# Patient Record
Sex: FEMALE | Race: WHITE | Employment: OTHER | URBAN - METROPOLITAN AREA
[De-identification: names, ages, dates, MRNs, and addresses within clinical notes are randomized per-mention and may not be internally consistent; named-entity substitution may affect disease eponyms.]

---

## 2017-02-17 ENCOUNTER — TELEPHONE (OUTPATIENT)
Dept: FAMILY MEDICINE CLINIC | Age: 57
End: 2017-02-17

## 2017-02-17 ENCOUNTER — OFFICE VISIT (OUTPATIENT)
Dept: FAMILY MEDICINE CLINIC | Age: 57
End: 2017-02-17

## 2017-02-17 ENCOUNTER — HOSPITAL ENCOUNTER (OUTPATIENT)
Dept: NON INVASIVE DIAGNOSTICS | Age: 57
Discharge: HOME | End: 2017-02-17
Attending: NURSE PRACTITIONER | Admitting: NURSE PRACTITIONER

## 2017-02-17 VITALS
WEIGHT: 208 LBS | DIASTOLIC BLOOD PRESSURE: 60 MMHG | SYSTOLIC BLOOD PRESSURE: 100 MMHG | HEIGHT: 70 IN | BODY MASS INDEX: 29.78 KG/M2 | HEART RATE: 68 BPM | TEMPERATURE: 98.2 F

## 2017-02-17 DIAGNOSIS — J06.9 UPPER RESPIRATORY TRACT INFECTION, UNSPECIFIED TYPE: Primary | ICD-10-CM

## 2017-02-17 DIAGNOSIS — R07.9 CHEST PAIN, UNSPECIFIED TYPE: ICD-10-CM

## 2017-02-17 LAB
BASOPHILS ABSOLUTE: 0.1 K/UL (ref 0–0.2)
BASOPHILS RELATIVE PERCENT: 0.9 %
D DIMER: 0.25 UG/ML FEU
EOSINOPHILS ABSOLUTE: 0.2 K/UL (ref 0–0.6)
EOSINOPHILS RELATIVE PERCENT: 3.2 %
HCT VFR BLD CALC: 40.5 % (ref 36–48)
HEMOGLOBIN: 13.8 G/DL (ref 12–16)
LYMPHOCYTES ABSOLUTE: 2 K/UL (ref 1–5.1)
LYMPHOCYTES RELATIVE PERCENT: 31.1 %
MCH RBC QN AUTO: 31.5 PG (ref 26–34)
MCHC RBC AUTO-ENTMCNC: 34.1 G/DL (ref 31–36)
MCV RBC AUTO: 92.3 FL (ref 80–100)
MONOCYTES ABSOLUTE: 0.8 K/UL (ref 0–1.3)
MONOCYTES RELATIVE PERCENT: 11.8 %
NEUTROPHILS ABSOLUTE: 3.5 K/UL (ref 1.7–7.7)
NEUTROPHILS RELATIVE PERCENT: 53 %
PDW BLD-RTO: 12.6 % (ref 12.4–15.4)
PLATELET # BLD: 259 K/UL (ref 135–450)
PMV BLD AUTO: 8.2 FL (ref 5–10.5)
RBC # BLD: 4.39 M/UL (ref 4–5.2)
WBC # BLD: 6.6 K/UL (ref 4–11)

## 2017-02-17 PROCEDURE — 99213 OFFICE O/P EST LOW 20 MIN: CPT | Performed by: NURSE PRACTITIONER

## 2017-02-17 RX ORDER — CLARITHROMYCIN 500 MG/1
500 TABLET, COATED ORAL 2 TIMES DAILY
Qty: 20 TABLET | Refills: 0 | Status: SHIPPED | OUTPATIENT
Start: 2017-02-17 | End: 2017-02-27

## 2017-02-17 ASSESSMENT — ENCOUNTER SYMPTOMS
SINUS PRESSURE: 0
SORE THROAT: 0
COUGH: 1
CHEST TIGHTNESS: 0
VOICE CHANGE: 0
WHEEZING: 0
RHINORRHEA: 0
SHORTNESS OF BREATH: 1

## 2017-02-18 LAB
A/G RATIO: 1.4 (ref 1.1–2.2)
ALBUMIN SERPL-MCNC: 4.5 G/DL (ref 3.4–5)
ALP BLD-CCNC: 75 U/L (ref 40–129)
ALT SERPL-CCNC: 25 U/L (ref 10–40)
ANION GAP SERPL CALCULATED.3IONS-SCNC: 17 MMOL/L (ref 3–16)
AST SERPL-CCNC: 22 U/L (ref 15–37)
BILIRUB SERPL-MCNC: <0.2 MG/DL (ref 0–1)
BUN BLDV-MCNC: 16 MG/DL (ref 7–20)
CALCIUM SERPL-MCNC: 9.9 MG/DL (ref 8.3–10.6)
CHLORIDE BLD-SCNC: 101 MMOL/L (ref 99–110)
CO2: 23 MMOL/L (ref 21–32)
CREAT SERPL-MCNC: 0.8 MG/DL (ref 0.6–1.1)
GFR AFRICAN AMERICAN: >60
GFR NON-AFRICAN AMERICAN: >60
GLOBULIN: 3.2 G/DL
GLUCOSE BLD-MCNC: 88 MG/DL (ref 70–99)
POTASSIUM SERPL-SCNC: 4.5 MMOL/L (ref 3.5–5.1)
SODIUM BLD-SCNC: 141 MMOL/L (ref 136–145)
TOTAL PROTEIN: 7.7 G/DL (ref 6.4–8.2)

## 2017-02-20 ENCOUNTER — TELEPHONE (OUTPATIENT)
Dept: FAMILY MEDICINE CLINIC | Age: 57
End: 2017-02-20

## 2017-02-20 RX ORDER — PREDNISONE 1 MG/1
TABLET ORAL
Qty: 36 TABLET | Refills: 0 | Status: SHIPPED | OUTPATIENT
Start: 2017-02-20 | End: 2017-11-11 | Stop reason: ALTCHOICE

## 2017-11-16 ENCOUNTER — OFFICE VISIT (OUTPATIENT)
Dept: FAMILY MEDICINE CLINIC | Age: 57
End: 2017-11-16

## 2017-11-16 VITALS
TEMPERATURE: 98.1 F | BODY MASS INDEX: 30.49 KG/M2 | SYSTOLIC BLOOD PRESSURE: 120 MMHG | HEIGHT: 70 IN | WEIGHT: 213 LBS | OXYGEN SATURATION: 96 % | HEART RATE: 70 BPM | DIASTOLIC BLOOD PRESSURE: 78 MMHG

## 2017-11-16 DIAGNOSIS — R10.13 EPIGASTRIC PAIN: ICD-10-CM

## 2017-11-16 DIAGNOSIS — R07.9 CHEST PAIN, UNSPECIFIED TYPE: Primary | ICD-10-CM

## 2017-11-16 PROCEDURE — 99213 OFFICE O/P EST LOW 20 MIN: CPT | Performed by: NURSE PRACTITIONER

## 2017-11-16 ASSESSMENT — ENCOUNTER SYMPTOMS
CONSTIPATION: 0
SINUS PRESSURE: 0
CHEST TIGHTNESS: 0
SHORTNESS OF BREATH: 1
VOMITING: 0
NAUSEA: 0
SORE THROAT: 0
WHEEZING: 0
RHINORRHEA: 0
VOICE CHANGE: 0
DIARRHEA: 0

## 2017-11-16 ASSESSMENT — PATIENT HEALTH QUESTIONNAIRE - PHQ9
1. LITTLE INTEREST OR PLEASURE IN DOING THINGS: 0
SUM OF ALL RESPONSES TO PHQ9 QUESTIONS 1 & 2: 0
SUM OF ALL RESPONSES TO PHQ QUESTIONS 1-9: 0
2. FEELING DOWN, DEPRESSED OR HOPELESS: 0

## 2017-11-16 NOTE — PROGRESS NOTES
Subjective:      Patient ID: Avery De La Torre is a 56 y.o. female. HPI  Patient is here for follow up of ER visit for sudden onset  midsternal chest pain on 11/11/2017. This occurred shortly after eating chinese food, and she described it as a stabbing pain that radiated through to her back and was at a 10/10 on a pain scale. Once she was taken to the ED and evaluated the pain has subsided some. EKG was non-concerning, chest x-ray showed no acute abnormalities. Laboratory results showed no notable abnormalities, including a negative initial troponin. Delta troponin was also negative. Her symptoms resolved without intervention, and she was released. Of note is that she was seen last February with complaint of feeling as though she couldn't \"take a deep breath\", and reports that she still is having this same symptom. 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 pelvic organs          Review of Systems   Constitutional: Negative for activity change, appetite change, chills, diaphoresis, fatigue, fever and unexpected weight change (weight is up 20 lbs from 2014; 10 lbs from 2/2017). HENT: Negative for congestion, ear pain, postnasal drip, rhinorrhea, sinus pressure, sneezing, sore throat and voice change. Respiratory: Positive for shortness of breath (see HPI). Negative for chest tightness and wheezing. Non smoker   Cardiovascular: Negative for palpitations and leg swelling. Chest pain: see HPI>>currently resolved. No HTN    No CAD   Gastrointestinal: Negative for constipation, diarrhea, nausea and vomiting. Abdominal pain: see HPI. Endocrine:        No DM   Genitourinary: Negative. Musculoskeletal: Negative. Neurological: Negative. Psychiatric/Behavioral: Negative. Objective:   Physical Exam   Constitutional: She is oriented to person, place, and time. She appears well-developed and well-nourished.

## 2018-02-28 ENCOUNTER — TELEPHONE (OUTPATIENT)
Dept: FAMILY MEDICINE CLINIC | Age: 58
End: 2018-02-28

## 2018-02-28 DIAGNOSIS — R10.13 EPIGASTRIC PAIN: ICD-10-CM

## 2018-02-28 LAB
A/G RATIO: 1.8 (ref 1.1–2.2)
ALBUMIN SERPL-MCNC: 4.4 G/DL (ref 3.4–5)
ALP BLD-CCNC: 76 U/L (ref 40–129)
ALT SERPL-CCNC: 31 U/L (ref 10–40)
AMYLASE: 90 U/L (ref 25–115)
ANION GAP SERPL CALCULATED.3IONS-SCNC: 10 MMOL/L (ref 3–16)
AST SERPL-CCNC: 25 U/L (ref 15–37)
BILIRUB SERPL-MCNC: 0.6 MG/DL (ref 0–1)
BUN BLDV-MCNC: 9 MG/DL (ref 7–20)
CALCIUM SERPL-MCNC: 9.1 MG/DL (ref 8.3–10.6)
CHLORIDE BLD-SCNC: 104 MMOL/L (ref 99–110)
CO2: 28 MMOL/L (ref 21–32)
CREAT SERPL-MCNC: 0.6 MG/DL (ref 0.6–1.1)
GFR AFRICAN AMERICAN: >60
GFR NON-AFRICAN AMERICAN: >60
GLOBULIN: 2.5 G/DL
GLUCOSE BLD-MCNC: 99 MG/DL (ref 70–99)
LIPASE: 50 U/L (ref 13–60)
POTASSIUM SERPL-SCNC: 4.1 MMOL/L (ref 3.5–5.1)
SODIUM BLD-SCNC: 142 MMOL/L (ref 136–145)
TOTAL PROTEIN: 6.9 G/DL (ref 6.4–8.2)

## 2018-04-10 ENCOUNTER — HOSPITAL ENCOUNTER (OUTPATIENT)
Dept: WOMENS IMAGING | Age: 58
Discharge: HOME | End: 2018-04-10
Attending: OBSTETRICS & GYNECOLOGY | Admitting: OBSTETRICS & GYNECOLOGY

## 2018-04-10 DIAGNOSIS — N64.4 BREAST PAIN: ICD-10-CM

## 2018-04-10 DIAGNOSIS — N64.4 MASTODYNIA: ICD-10-CM

## 2019-10-10 ENCOUNTER — OFFICE VISIT (OUTPATIENT)
Dept: FAMILY MEDICINE CLINIC | Age: 59
End: 2019-10-10
Payer: COMMERCIAL

## 2019-10-10 VITALS
WEIGHT: 222.3 LBS | HEIGHT: 70 IN | OXYGEN SATURATION: 94 % | BODY MASS INDEX: 31.82 KG/M2 | SYSTOLIC BLOOD PRESSURE: 125 MMHG | HEART RATE: 85 BPM | DIASTOLIC BLOOD PRESSURE: 80 MMHG

## 2019-10-10 DIAGNOSIS — H53.9 VISION CHANGES: ICD-10-CM

## 2019-10-10 DIAGNOSIS — Z12.31 ENCOUNTER FOR SCREENING MAMMOGRAM FOR BREAST CANCER: ICD-10-CM

## 2019-10-10 DIAGNOSIS — R20.0 NUMBNESS: Primary | ICD-10-CM

## 2019-10-10 DIAGNOSIS — Z12.11 SCREENING FOR COLON CANCER: ICD-10-CM

## 2019-10-10 LAB
A/G RATIO: 2.2 (ref 1.1–2.2)
ALBUMIN SERPL-MCNC: 5.2 G/DL (ref 3.4–5)
ALP BLD-CCNC: 79 U/L (ref 40–129)
ALT SERPL-CCNC: 30 U/L (ref 10–40)
ANION GAP SERPL CALCULATED.3IONS-SCNC: 15 MMOL/L (ref 3–16)
AST SERPL-CCNC: 26 U/L (ref 15–37)
BILIRUB SERPL-MCNC: 0.5 MG/DL (ref 0–1)
BUN BLDV-MCNC: 10 MG/DL (ref 7–20)
CALCIUM SERPL-MCNC: 10.6 MG/DL (ref 8.3–10.6)
CHLORIDE BLD-SCNC: 101 MMOL/L (ref 99–110)
CO2: 26 MMOL/L (ref 21–32)
CREAT SERPL-MCNC: 0.7 MG/DL (ref 0.6–1.1)
GFR AFRICAN AMERICAN: >60
GFR NON-AFRICAN AMERICAN: >60
GLOBULIN: 2.4 G/DL
GLUCOSE BLD-MCNC: 74 MG/DL (ref 70–99)
POTASSIUM SERPL-SCNC: 4.7 MMOL/L (ref 3.5–5.1)
SODIUM BLD-SCNC: 142 MMOL/L (ref 136–145)
TOTAL PROTEIN: 7.6 G/DL (ref 6.4–8.2)
TSH REFLEX: 1.18 UIU/ML (ref 0.27–4.2)
VITAMIN B-12: >2000 PG/ML (ref 211–911)

## 2019-10-10 PROCEDURE — 99204 OFFICE O/P NEW MOD 45 MIN: CPT | Performed by: INTERNAL MEDICINE

## 2019-10-10 PROCEDURE — 36415 COLL VENOUS BLD VENIPUNCTURE: CPT | Performed by: INTERNAL MEDICINE

## 2019-10-10 ASSESSMENT — PATIENT HEALTH QUESTIONNAIRE - PHQ9
SUM OF ALL RESPONSES TO PHQ QUESTIONS 1-9: 1
2. FEELING DOWN, DEPRESSED OR HOPELESS: 1
SUM OF ALL RESPONSES TO PHQ QUESTIONS 1-9: 1

## 2019-10-11 LAB
ESTIMATED AVERAGE GLUCOSE: 102.5 MG/DL
HBA1C MFR BLD: 5.2 %

## 2019-10-17 ENCOUNTER — HOSPITAL ENCOUNTER (OUTPATIENT)
Dept: MRI IMAGING | Age: 59
Discharge: HOME | End: 2019-10-17
Payer: COMMERCIAL

## 2019-10-17 DIAGNOSIS — H53.9 VISION CHANGES: ICD-10-CM

## 2019-10-17 DIAGNOSIS — R20.0 NUMBNESS: ICD-10-CM

## 2019-10-17 PROCEDURE — 6360000004 HC RX CONTRAST MEDICATION: Performed by: FAMILY MEDICINE

## 2019-10-17 PROCEDURE — 70553 MRI BRAIN STEM W/O & W/DYE: CPT

## 2019-10-17 PROCEDURE — A9577 INJ MULTIHANCE: HCPCS | Performed by: FAMILY MEDICINE

## 2019-10-17 RX ADMIN — GADOBENATE DIMEGLUMINE 20 ML: 529 INJECTION, SOLUTION INTRAVENOUS at 14:37

## 2019-10-18 ENCOUNTER — TELEPHONE (OUTPATIENT)
Dept: FAMILY MEDICINE CLINIC | Age: 59
End: 2019-10-18

## 2019-10-18 NOTE — TELEPHONE ENCOUNTER
Pt called in regarding her MRI results. Informed pt that dr. Manan Burgos is not in on Friday's but an MA will reach out once results have been read.

## 2019-10-21 DIAGNOSIS — R20.0 NUMBNESS: Primary | ICD-10-CM

## 2019-10-21 DIAGNOSIS — R90.89 MRI OF BRAIN ABNORMAL: ICD-10-CM

## 2019-10-21 DIAGNOSIS — H53.9 VISION CHANGES: ICD-10-CM

## 2019-10-21 NOTE — TELEPHONE ENCOUNTER
dw pt. She does have a hyperintensity that is small - unclear if contributing to her symptoms.  She will see neurology -referral info given

## 2019-11-12 ENCOUNTER — TELEPHONE (OUTPATIENT)
Dept: FAMILY MEDICINE CLINIC | Age: 59
End: 2019-11-12

## 2020-06-17 ENCOUNTER — TELEPHONE (OUTPATIENT)
Dept: FAMILY MEDICINE CLINIC | Age: 60
End: 2020-06-17

## 2020-06-17 NOTE — TELEPHONE ENCOUNTER
PT called in stating that she has poison ivy near her eye. PT would like to know if she needs to make an appointment or if something can be called in.      Please call PT back: [phone]

## 2020-06-18 ENCOUNTER — VIRTUAL VISIT (OUTPATIENT)
Dept: FAMILY MEDICINE CLINIC | Age: 60
End: 2020-06-18
Payer: COMMERCIAL

## 2020-06-18 PROCEDURE — 99213 OFFICE O/P EST LOW 20 MIN: CPT | Performed by: NURSE PRACTITIONER

## 2020-06-18 RX ORDER — METHYLPREDNISOLONE 4 MG/1
TABLET ORAL
Qty: 1 KIT | Refills: 0 | Status: SHIPPED | OUTPATIENT
Start: 2020-06-18 | End: 2020-06-24

## 2020-06-18 NOTE — PROGRESS NOTES
2020    TELEHEALTH EVALUATION -- Audio/Visual (During IWVBQ-75 public health emergency)    HPI:    Geoffrey Ramsay (:  1960) has requested an audio/video evaluation for the following concern(s):    Possible poison jed    Rash: Geoffrey Ramsay is a 64 y.o. female who presents with a 1 month history of a scattered rash. Rash first presented on the left antecubital fossa and has spread to the right abdomen  and left face. Rash is red and is pruritic and raised. Associated symptoms include none. Patient has tried nothing. New exposures: she was gardening. Patient has not had contacts with similar symptoms. Prior history of similar symptoms: yes - she has had poison ivy before. Review of Systems    Prior to Visit Medications    Medication Sig Taking? Authorizing Provider   methylPREDNISolone (MEDROL DOSEPACK) 4 MG tablet Take by mouth. Yes TRENTON Busch - CNP   ondansetron (ZOFRAN) 4 MG tablet Take 1 tablet by mouth every 8 hours as needed for Nausea  TIERNEY Myrick       Social History     Tobacco Use    Smoking status: Former Smoker     Packs/day: 1.00     Years: 15.00     Pack years: 15.00     Types: Cigarettes     Last attempt to quit: 1994     Years since quittin.4    Smokeless tobacco: Never Used   Substance Use Topics    Alcohol use: No    Drug use:  No            PHYSICAL EXAMINATION:  [ INSTRUCTIONS:  \"[x]\" Indicates a positive item  \"[]\" Indicates a negative item  -- DELETE ALL ITEMS NOT EXAMINED]  Vital Signs: (As obtained by patient/caregiver or practitioner observation)    Blood pressure-  Heart rate-    Respiratory rate-    Temperature-  Pulse oximetry-     Constitutional: [x] Appears well-developed and well-nourished [x] No apparent distress      [] Abnormal-   Mental status  [x] Alert and awake  [x] Oriented to person/place/time [x]Able to follow commands      Eyes:  EOM    [x]  Normal  [] Abnormal-  Sclera  [x]  Normal  [] Abnormal - Discharge [x]  None visible  [] Abnormal -    HENT:   [x] Normocephalic, atraumatic. [] Abnormal   [x] Mouth/Throat: Mucous membranes are moist.     External Ears [x] Normal  [] Abnormal-     Neck: [x] No visualized mass     Pulmonary/Chest: [] Respiratory effort normal.  [x] No visualized signs of difficulty breathing or respiratory distress        [] Abnormal-        Neurological:        [x] No Facial Asymmetry (Cranial nerve 7 motor function) (limited exam to video visit)          [x] No gaze palsy        [] Abnormal-         Skin:        [] No significant exanthematous lesions or discoloration noted on facial skin         [x] Abnormal- con thematous patches to left arm, left forehead and right abdomen            Psychiatric:       [x] Normal Affect [x] No Hallucinations        [] Abnormal-     Other pertinent observable physical exam findings-     ASSESSMENT/PLAN:  1. Contact dermatitis and eczema due to plant  Will treat with steroids. Also recommend OTC hydrocortisone although cautioned to not use close to eyes. Notify office if symptoms do not improve. - methylPREDNISolone (MEDROL DOSEPACK) 4 MG tablet; Take by mouth. Dispense: 1 kit; Refill: 0      No follow-ups on file. Terri Dietz is a 64 y.o. female being evaluated by a Virtual Visit (video visit) encounter to address concerns as mentioned above. A caregiver was present when appropriate. Due to this being a TeleHealth encounter (During MJWXY-07 public health emergency), evaluation of the following organ systems was limited: Vitals/Constitutional/EENT/Resp/CV/GI//MS/Neuro/Skin/Heme-Lymph-Imm.   Pursuant to the emergency declaration under the [facility], [address] [place] [address] authority and the [employer] Act, this Virtual Visit was conducted with patient's (and/or legal guardian's) consent, to reduce the patient's risk of exposure to COVID-19 and provide

## 2020-09-23 ENCOUNTER — TELEPHONE (OUTPATIENT)
Dept: FAMILY MEDICINE CLINIC | Age: 60
End: 2020-09-23

## 2021-01-18 ENCOUNTER — VIRTUAL VISIT (OUTPATIENT)
Dept: FAMILY MEDICINE CLINIC | Age: 61
End: 2021-01-18
Payer: COMMERCIAL

## 2021-01-18 ENCOUNTER — TELEPHONE (OUTPATIENT)
Dept: FAMILY MEDICINE CLINIC | Age: 61
End: 2021-01-18

## 2021-01-18 DIAGNOSIS — L24.7 CONTACT DERMATITIS AND ECZEMA DUE TO PLANT: Primary | ICD-10-CM

## 2021-01-18 PROCEDURE — 1036F TOBACCO NON-USER: CPT | Performed by: FAMILY MEDICINE

## 2021-01-18 PROCEDURE — 99213 OFFICE O/P EST LOW 20 MIN: CPT | Performed by: FAMILY MEDICINE

## 2021-01-18 PROCEDURE — G8427 DOCREV CUR MEDS BY ELIG CLIN: HCPCS | Performed by: FAMILY MEDICINE

## 2021-01-18 PROCEDURE — G8421 BMI NOT CALCULATED: HCPCS | Performed by: FAMILY MEDICINE

## 2021-01-18 PROCEDURE — G8484 FLU IMMUNIZE NO ADMIN: HCPCS | Performed by: FAMILY MEDICINE

## 2021-01-18 PROCEDURE — 3017F COLORECTAL CA SCREEN DOC REV: CPT | Performed by: FAMILY MEDICINE

## 2021-01-18 RX ORDER — PREDNISONE 10 MG/1
TABLET ORAL
Qty: 21 TABLET | Refills: 0 | Status: SHIPPED | OUTPATIENT
Start: 2021-01-18 | End: 2021-01-28

## 2021-01-18 NOTE — PROGRESS NOTES
TELEHEALTH EVALUATION -- Audio/Visual (During SAFYR-92 public health emergency)    Kaila Villavicencio is a 64 y.o. female being evaluated by a Virtual Visit (video visit) encounter to address concerns as mentioned above. A caregiver was present when appropriate. Due to this being a TeleHealth encounter (During LHYBK-97 public health emergency), evaluation of the following organ systems was limited: Vitals/Constitutional/EENT/Resp/CV/GI//MS/Neuro/Skin/Heme-Lymph-Imm. Pursuant to the emergency declaration under the [address] [place] [address], [address] [place] [address] authority and the [employer] Act, this Virtual Visit was conducted with patient's (and/or legal guardian's) consent, to reduce the patient's risk of exposure to COVID-19 and provide necessary medical care. The patient (and/or legal guardian) has also been advised to contact this office for worsening conditions or problems, and seek emergency medical treatment and/or call 911 if deemed necessary. Services were provided through a video synchronous discussion virtually to substitute for in-person clinic visit. Patient and provider were located at their individual homes. --Rob Feliciano MD on 1/18/2021 at 4:39 PM    An electronic signature was used to authenticate this note. Chief Complaint   Patient presents with    Rash     FACE,BODY     Family History   Problem Relation Age of Onset    Diabetes Mother     Diabetes Father     High Cholesterol Father      Social History     Socioeconomic History    Marital status:       Spouse name: Not on file    Number of children: Not on file    Years of education: Not on file    Highest education level: Not on file   Occupational History    Not on file   Social Needs    Financial resource strain: Not on file    Food insecurity     Worry: Not on file     Inability: Not on file   [employer] needs Medical: Not on file     Non-medical: Not on file   Tobacco Use    Smoking status: Former Smoker     Packs/day: 1.00     Years: 15.00     Pack years: 15.00     Types: Cigarettes     Quit date: 1994     Years since quittin.0    Smokeless tobacco: Never Used   Substance and Sexual Activity    Alcohol use: No    Drug use: No    Sexual activity: Yes   Lifestyle    Physical activity     Days per week: Not on file     Minutes per session: Not on file    Stress: Not on file   Relationships    Social connections     Talks on phone: Not on file     Gets together: Not on file     Attends Nondenominational service: Not on file     Active member of club or organization: Not on file     Attends meetings of clubs or organizations: Not on file     Relationship status: Not on file    Intimate partner violence     Fear of current or ex partner: Not on file     Emotionally abused: Not on file     Physically abused: Not on file     Forced sexual activity: Not on file   Other Topics Concern    Not on file   Social History Narrative    Not on file       Current Outpatient Medications:     predniSONE (DELTASONE) 10 MG tablet, 1 by mouth twice daily x 7 days; then 1 by mouth once daily for 7 days, Disp: 21 tablet, Rfl: 0  No Known Allergies    Patient Active Problem List   Diagnosis    Benign positional vertigo    Constipation       HPI / ROS: Zonia Monaco presents for evaluation and management of :    # Rash she started ACV tabs this rash is under arms  First then edge of eye then legs. Rash is not painful not itchy. No recent advil use. No fever cp/sob. Takes very few meds. \" I am severely allergic poison ivy\" Duration  5 days .         Wt Readings from Last 3 Encounters:   10/10/19 222 lb 4.8 oz (100.8 kg)   18 207 lb 10.8 oz (94.2 kg)   17 213 lb (96.6 kg)       PE : virtual visit  Skin : scattered somewhat grouped raised sl pink lesions which seda not herpetic       Diagnosis Orders 1. Contact dermatitis and eczema due to plant      pred x 14 dya taper call INB     No orders of the defined types were placed in this encounter.

## 2021-07-02 ENCOUNTER — OFFICE VISIT (OUTPATIENT)
Dept: FAMILY MEDICINE CLINIC | Age: 61
End: 2021-07-02
Payer: COMMERCIAL

## 2021-07-02 VITALS
HEIGHT: 70 IN | WEIGHT: 226 LBS | SYSTOLIC BLOOD PRESSURE: 128 MMHG | TEMPERATURE: 97.4 F | HEART RATE: 78 BPM | OXYGEN SATURATION: 90 % | RESPIRATION RATE: 16 BRPM | DIASTOLIC BLOOD PRESSURE: 82 MMHG | BODY MASS INDEX: 32.35 KG/M2

## 2021-07-02 DIAGNOSIS — R49.0 HOARSENESS: Primary | ICD-10-CM

## 2021-07-02 LAB — TSH REFLEX: 0.81 UIU/ML (ref 0.27–4.2)

## 2021-07-02 PROCEDURE — 36415 COLL VENOUS BLD VENIPUNCTURE: CPT | Performed by: INTERNAL MEDICINE

## 2021-07-02 PROCEDURE — 99213 OFFICE O/P EST LOW 20 MIN: CPT | Performed by: INTERNAL MEDICINE

## 2021-07-02 PROCEDURE — 1036F TOBACCO NON-USER: CPT | Performed by: INTERNAL MEDICINE

## 2021-07-02 PROCEDURE — G8417 CALC BMI ABV UP PARAM F/U: HCPCS | Performed by: INTERNAL MEDICINE

## 2021-07-02 PROCEDURE — G8427 DOCREV CUR MEDS BY ELIG CLIN: HCPCS | Performed by: INTERNAL MEDICINE

## 2021-07-02 PROCEDURE — 3017F COLORECTAL CA SCREEN DOC REV: CPT | Performed by: INTERNAL MEDICINE

## 2021-07-02 ASSESSMENT — ENCOUNTER SYMPTOMS
VOMITING: 0
VOICE CHANGE: 1
SINUS PRESSURE: 0
SORE THROAT: 0
SHORTNESS OF BREATH: 0
TROUBLE SWALLOWING: 0
COUGH: 0
ABDOMINAL PAIN: 0
EYE DISCHARGE: 0
WHEEZING: 0
DIARRHEA: 0
EYE REDNESS: 0
NAUSEA: 0
RHINORRHEA: 0
SINUS PAIN: 0

## 2021-07-02 NOTE — PROGRESS NOTES
Luciana Haskins (:  1960) is a 63 y.o. female,Established patient, here for evaluation of the following chief complaint(s): Other         ASSESSMENT/PLAN:  1. Hoarseness  -     Noah Becker MD, Otolaryngology, [facility]  -     [facility] with Reflex  Discussed, none presents for hoarseness. She has no other symptoms to indicate GERD or allergy/postnasal drip, etc.  We will hold off on any treatments and recommend evaluation with ENT. She voiced agreement and understanding. Follow-up here after evaluation there if cause is not identified    Return if symptoms worsen or fail to improve. Subjective   SUBJECTIVE/OBJECTIVE:  HPI  Presents to discuss hoarseness of her voice. She noted over the last 1 month. 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 She has not noted any associated symptoms including a sore throat, postnasal drip, difficulty or painful swallowing. No recent fever or chills. No unexpected weight loss. She denies any seasonal allergies. She denies any GERD symptoms. No cough or shortness of breath. She otherwise feels well. Remote history of smoking, quit 25 years ago. Review of Systems   Constitutional: Negative for chills, fever and unexpected weight change. HENT: Positive for voice change. Negative for congestion, ear pain, rhinorrhea, sinus pressure, sinus pain, sore throat and trouble swallowing. Eyes: Negative for discharge and redness. Respiratory: Negative for cough, shortness of breath and wheezing. Cardiovascular: Negative for chest pain, palpitations and leg swelling. Gastrointestinal: Negative for abdominal pain, diarrhea, nausea and vomiting. Musculoskeletal: Negative for myalgias. Skin: Negative for rash. Allergic/Immunologic: Negative for environmental allergies. Neurological: Negative for speech difficulty.           Objective    /82   Pulse 78   Temp 97.4 °F (36.3 °C)   Resp 16   Ht 5' 10\" (1.778 m)   Wt 226 lb (102.5 kg)   LMP 02/01/2009   SpO2 90%   BMI 32.43 kg/m²     Physical Exam  Constitutional:       Appearance: Normal appearance. HENT:      Head: Normocephalic and atraumatic. Right Ear: Tympanic membrane, ear canal and external ear normal.      Left Ear: Tympanic membrane, ear canal and external ear normal.      Nose: Nose normal. No congestion. Mouth/Throat:      Mouth: Mucous membranes are moist.      Pharynx: Oropharynx is clear. No oropharyngeal exudate or posterior oropharyngeal erythema. Eyes:      General:         Left eye: No discharge. Extraocular Movements: Extraocular movements intact. Conjunctiva/sclera: Conjunctivae normal.      Pupils: Pupils are equal, round, and reactive to light. Cardiovascular:      Rate and Rhythm: Normal rate and regular rhythm. Heart sounds: No murmur heard. Pulmonary:      Effort: Pulmonary effort is normal. No respiratory distress. Breath sounds: Normal breath sounds. No wheezing or rales. Musculoskeletal:      Cervical back: Neck supple. No tenderness. Right lower leg: No edema. Left lower leg: No edema. Neurological:      General: No focal deficit present. Mental Status: She is alert. An electronic signature was used to authenticate this note.     --Syl Moss MD

## 2021-07-20 ENCOUNTER — OFFICE VISIT (OUTPATIENT)
Dept: ENT CLINIC | Age: 61
End: 2021-07-20
Payer: COMMERCIAL

## 2021-07-20 VITALS
SYSTOLIC BLOOD PRESSURE: 125 MMHG | HEART RATE: 89 BPM | WEIGHT: 227 LBS | DIASTOLIC BLOOD PRESSURE: 85 MMHG | BODY MASS INDEX: 32.57 KG/M2

## 2021-07-20 DIAGNOSIS — R49.0 DYSPHONIA: ICD-10-CM

## 2021-07-20 DIAGNOSIS — J38.00 VOCAL CORD WEAKNESS: ICD-10-CM

## 2021-07-20 DIAGNOSIS — R49.0 HOARSENESS: Primary | ICD-10-CM

## 2021-07-20 DIAGNOSIS — K21.9 LARYNGOPHARYNGEAL REFLUX (LPR): ICD-10-CM

## 2021-07-20 PROCEDURE — 31575 DIAGNOSTIC LARYNGOSCOPY: CPT | Performed by: STUDENT IN AN ORGANIZED HEALTH CARE EDUCATION/TRAINING PROGRAM

## 2021-07-20 PROCEDURE — 99204 OFFICE O/P NEW MOD 45 MIN: CPT | Performed by: STUDENT IN AN ORGANIZED HEALTH CARE EDUCATION/TRAINING PROGRAM

## 2021-07-20 PROCEDURE — G8427 DOCREV CUR MEDS BY ELIG CLIN: HCPCS | Performed by: STUDENT IN AN ORGANIZED HEALTH CARE EDUCATION/TRAINING PROGRAM

## 2021-07-20 PROCEDURE — G8417 CALC BMI ABV UP PARAM F/U: HCPCS | Performed by: STUDENT IN AN ORGANIZED HEALTH CARE EDUCATION/TRAINING PROGRAM

## 2021-07-20 RX ORDER — OMEPRAZOLE 40 MG/1
CAPSULE, DELAYED RELEASE ORAL
Qty: 30 CAPSULE | Refills: 5 | Status: SHIPPED | OUTPATIENT
Start: 2021-07-20 | End: 2022-01-05

## 2021-07-20 NOTE — PROGRESS NOTES
[address] [facility] (:  1960) is a 63 y.o. female, here for evaluation of the following chief complaint(s):  Hoarse (2 months ago )      ASSESSMENT/PLAN:  1. Hoarseness  -     [address] [facility] 1330, SLP - Speech Therapy [facility]  2. Vocal cord weakness  3. Laryngopharyngeal reflux (LPR)      This is a very pleasant 63 y.o. female here today for evaluation of the the above-noted complaints. I suspect that the patient's dysphonia is related to a combination of the laryngopharyngeal reflux as well as voice overuse. I will prescribe the patient 40 mg of omeprazole to take 30 minutes prior to her evening meal.  Additionally I have asked her to meet with my colleague in speech and language pathology, the esteemed Dr. Abdoul Julien to work with her on her voice habits to improve her overall voice health. I will plan to see her back when she sees Cassandra aguilar. SUBJECTIVE/OBJECTIVE:  [facility]    Lindy Dunn is here today for evaluation of issues related to her voice. The patient states that for the last several months she has felt like her voice is going out. She will get hoarse at times. She does note that she talks on the phone extensively at work as well as in her personal life. She also needs to yell a lot as she is taking care of her 4 grandchildren and they can be a bit unruly. She denies any history of smoking and drinking. She has no neck pain. She has no lumps or bumps of the head and neck    REVIEW OF SYSTEMS  The following systems were reviewed and revealed the following in addition to any already discussed in the HPI:    PHYSICAL EXAM    GENERAL: No acute distress, alert and oriented, no hoarseness, strong voice  EYES: EOMI, Anti-icteric  HENT:   Head: Normocephalic and atraumatic.    Face:  Symmetric, facial nerve intact, no sinus tenderness  Right Ear: Normal external ear, normal external auditory canal, intact tympanic membrane with normal mobility and aerated middle ear  Left Ear: Normal external ear, normal external auditory canal, intact tympanic membrane with normal mobility and aerated middle ear  Mouth/Oral Cavity:  normal lips, Uvula is midline, no mucosal lesions, no trismus, normal dentition, normal salivary quality/flow  Oropharynx/Larynx:  normal oropharynx, 1+ tonsils; patient did not tolerate mirror exam due to excessive gag reflex  Nose:Normal external nasal appearance. Anterior rhinoscopy shows  a deviated septum preventing view posteriorly. Normal turbinates. Normal mucosa   NECK: Normal range of motion, no thyromegaly, trachea is midline, no lymphadenopathy, no neck masses, no crepitus  CHEST: Normal respiratory effort, no retractions, breathing comfortably  SKIN: No rashes, normal appearing skin, no evidence of skin lesions/tumors  Neuro:  cranial nerve II-XII intact; normal gait  Cardio:  no edema    Normal voice    PROCEDURE  Flexible Laryngoscopy CPT Code 97501    Pre op: Dysphonia. Post op: Same  Procedure : Flexible Laryngoscopy  Surgeon: Katerin Simms MD  Anesthesia: Afrin with 4% lidocaine  Indication: Laryngeal mirror examination was not tolerated due to gag reflex  Description:  The scope was passed along the floor of the right naris to the level of the larynx. There was no evidence of concerning masses or lesions of the base of tongue, vallecula, epiglottis, aryepiglottic folds, arytenoids, false vocal folds, true vocal folds, or pyriform sinuses. True vocal folds exhibited symmetric motion bilaterally without evidence of paralysis or paresis. The scope was removed. The patient tolerated the procedure without difficulty. There were no complications. Pertinent findings: There is evidence of mild edema of the bilateral vocal cords with no identifiable masses. True vocal cord motion was symmetric.       This note was generated completely or in part utilizing Dragon dictation speech recognition software. Occasionally, words are mistranscribed and despite editing, the text may contain inaccuracies due to incorrect word recognition. If further clarification is needed please contact the office at [phone]. An electronic signature was used to authenticate this note.     --Bry No MD

## 2021-08-25 ENCOUNTER — PROCEDURE VISIT (OUTPATIENT)
Dept: SPEECH THERAPY | Age: 61
End: 2021-08-25
Payer: COMMERCIAL

## 2021-08-25 DIAGNOSIS — R49.0 DYSPHONIA: ICD-10-CM

## 2021-08-25 PROCEDURE — 92520 LARYNGEAL FUNCTION STUDIES: CPT | Performed by: SPEECH-LANGUAGE PATHOLOGIST

## 2021-08-25 PROCEDURE — 92507 TX SP LANG VOICE COMM INDIV: CPT | Performed by: SPEECH-LANGUAGE PATHOLOGIST

## 2021-08-25 PROCEDURE — 31579 LARYNGOSCOPY TELESCOPIC: CPT | Performed by: SPEECH-LANGUAGE PATHOLOGIST

## 2021-08-25 PROCEDURE — 92524 BEHAVRAL QUALIT ANALYS VOICE: CPT | Performed by: SPEECH-LANGUAGE PATHOLOGIST

## 2021-08-25 NOTE — PROGRESS NOTES
[facility] ENT  Videostroboscopic Examination of the Larynx    BACKGROUND HISTORY:  Pt endorsed hx of dysphonia for the last 3-4 months; characterized by roughness, inability to elevate volume, and reduced pitch range during singing. Pt uses voice frequently, including answering phone calls at work, caring for grandsons (uses phonotraumatic behaviors) and general conversation. Denied sensation of discomfort however, does endorse sensation of strain when attempting to elevate volume. Denied dysphagia or dyspnea. Has been taking tsp of apple cider vinegar to assist w/ reflux; does not want to take medication prescribed by ENT. Previous hx of smoking; quit approx 30 years ago. Surgical/Medical History: See the above. Hydration: >64oz of water  Smoking History: Previously  Caffeine Intake: Coffee and sweet tea    PER ENT NOTE, Dr. Corin Dan, 7/20/21:  I suspect that the patient's dysphonia is related to a combination of the laryngopharyngeal reflux as well as voice overuse. I will prescribe the patient 40 mg of omeprazole to take 30 minutes prior to her evening meal.  Additionally I have asked her to meet with my colleague in speech and language pathology, the esteemed Dr. Julio Patino to work with her on her voice habits to improve her overall voice health. Perceptual Quality: Pt presented with mild dysphonia characterized by intermittent roughness and back-focused phonation. Acoustic Analysis:  Maximum Sustained Phonation- 18 seconds  Avg Hz- 173  Max Hz- 542  Min Hz- 143    Flexible Stroboscopy Laryngoscopy  Procedure : Flexible Stroboscopy Laryngoscopy  Performed by: DALLAS Reyes  Anesthesia: Afrin with 4% lidocaine  Description:  The scope was passed along the floor of the right naris to the level of the larynx.  There was no evidence of concerning masses or lesions of the base of tongue, vallecula, epiglottis, aryepiglottic folds, arytenoids, false vocal folds, true vocal folds, or pyriform sinuses. The scope was removed. The patient tolerated the procedure without difficulty. There were no complications. Pertinent findings: See Assessment and Plan of Care       Abduction; muscle tension            Adduction; complete closure     Medial edge edema             Spindle shaped closure    Abduction    ASSESSMENT AND PLAN OF CARE:   63 y.o. female w/ hx of acute dysphonia over the last 3-4 months. VLS revealed medial edge edema bilaterally of TVFs w/ superior vascularities noted (R>L); none noted on medial edge and pt denied aphonia. Glottic closure fluctuated between complete and spindle shaped dependent on supraglottic tension; compensatory tension most likely secondary to weakness/discoordination (LM>AP). Functional mucosal wave and periodicity. Mild-moderate interarytenoid pachydermia and posterior edema, erythema and thick mucus were observed, indicative of laryngopharyngeal reflux. Subglottis was patent without evidence of narrowing. No mass lesions, paresis or paralysis was noted. EDUCATION AND THERAPY:  Reviewed VLS w/ pt including edema of medial edge and impact of phonotraumatic behaviors. Pt also endorsed frequent throat clearing which is most likely contributing as well. Educated to increased risk of hemorrhage d/t vascularities and need to reduce increased voice use. Discussed muscular discoordination and weakness. Introduced to [employer] to begin decreasing laryngeal tension and improving laryngeal control. Pt able to complete w/ min cues for relaxed, gentle voicing. Endorsed no tension or discomfort during completion.      PATIENT GOALS:  Pt will adhere to vocal hygiene protocol during daily life activities w/ 80% acc given mod cues  Pt will adhere to reflux management protocol during daily life activities w/ 80% acc given mod cues  Pt will perform SOVT techniques during various voicing tasks w/ 80% acc given mod cues  Pt will perform RVT techniques during various voicing tasks w/ 80% acc given mod cues  Pt will perform resistive/abdominal breathing exercises at rest and during phonation w/ 80% acc given mod cues  Pt will complete laryngeal/general relaxation stretches/exercises w/ 80% acc given mod cues    SLP recommended: Yes  Barriers to treatment: None  Potential benefits from rehab include: Improved vocal quality  Frequency: 4 sessions over 6-8/wk  Prognosis is: Good    RECOMMENDATIONS:   1. Dr. Jemma Rueda was present and reviewed recorded evaluation to assist in diagnosis and provide assessment and plan for treatment. 2. Follow good vocal hygiene behaviors and precautions, including increasing oral hydration. 3. Voice therapy to focus on re-strengthening and balancing the laryngeal musculature, to promote to open oral front focus, to promote using adequate diaphragmatic breath support to sustain conversational speech. 4. Pt is a good candidate for further medical evaluation/intervention at the discretion of the treating physician. 5. Pt to follow up with ENT/Dr. Jemma Rueda.       CPT Code Units Billed Time Billed Today Date of POC Start Re-Certification Date Referring Provider   92422, 77470, 54199, 40331 4 Unit Time in: 7028  Time out: 1115  Total time: 45 min 8/25/2021 60 days Dr. Jemma Rueda       Thank you,    Easton Montoya) [place], [place], [facility]; [id number]  Voice Specialized Speech-Language Pathologist

## 2021-09-23 ENCOUNTER — OFFICE VISIT (OUTPATIENT)
Dept: FAMILY MEDICINE CLINIC | Age: 61
End: 2021-09-23
Payer: COMMERCIAL

## 2021-09-23 ENCOUNTER — TELEPHONE (OUTPATIENT)
Dept: FAMILY MEDICINE CLINIC | Age: 61
End: 2021-09-23

## 2021-09-23 VITALS — TEMPERATURE: 98.5 F | OXYGEN SATURATION: 98 % | HEART RATE: 101 BPM

## 2021-09-23 DIAGNOSIS — U07.1 COVID-19: Primary | ICD-10-CM

## 2021-09-23 PROCEDURE — 99212 OFFICE O/P EST SF 10 MIN: CPT | Performed by: NURSE PRACTITIONER

## 2021-09-23 NOTE — TELEPHONE ENCOUNTER
PT called in wanting to make a same day appointment. PT tested positive for COVID last Tuesday (9/14) and is still experiencing extreme fatigue and a cough. PT says that the cough was starting to go away but in the last 2-3 days it has returned and PT is concerned it could turn to bronchitis or pneumonia. She also noted no fever since last week. Please adv if PT is able to come in (would've scheduled a VV but PT is concerned about the cough and wants her lungs checked out).      Best call back number: [phone]

## 2021-09-23 NOTE — TELEPHONE ENCOUNTER
Yes ok to schedule as a red visit at 3:40 today. Please have patient wait in her car. I will come out to her to assess her. Please document call and then close encounter.   thanks

## 2021-09-23 NOTE — PROGRESS NOTES
PROGRESS NOTE     Eden Vasquez [address] [facility] Physicians  Shahzad Maza [address] [address]  [phone] office  [phone] fax    Date of Service:  9/23/2021     Assessment / Plan:     1. COVID-19  Still recovering. Patient continues to have cough and shortness of breath with fatigue. Lungs are clear on exam and O2 sat 98% at rest.  O2 sat did drop to 94% with a walk test.  Patient did not have any increased work of breathing. Encourage patient to continue resting and increase p.o. fluid intake. Advised to get up at least once an hour and walk around to avoid blood clots. Unfortunately patient symptoms may last for a couple more weeks. Patient sister does have a pulse oximeter which she is going to borrow to monitor her oxygen level at home. Warning signs reviewed when to go to ER. Subjective:      Patient ID: Syd Camacho is a 63 y.o. female      CC: Covid +, lingering cough and fatigue    HPI  Respiratory Symptoms:  Patient complains of 2 week(s) history of shortness of breath, non-productive cough and fatigue. Symptoms have been unchanged with time. She denies fever, sore throat, nausea/vomiting and diarrhea. Relevant PMH: No pertinent PMH. Smoking history:  She  reports that she quit smoking about 27 years ago. Her smoking use included cigarettes. She has a 15.00 pack-year smoking history. She has never used smokeless tobacco. She has had no known ill contacts. Treatment to date: none. Vitals:    09/23/21 1605   Pulse: 101   Temp: 98.5 °F (36.9 °C)   SpO2: 98%       No outpatient medications have been marked as taking for the 9/23/21 encounter (Office Visit) with TRENTON Forte CNP.        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 pelvic organs        Past Surgical History:   Procedure Laterality Date    ABDOMEN SURGERY      pelvic organ prolapse       Social History     Tobacco Use  Smoking status: Former Smoker     Packs/day: 1.00     Years: 15.00     Pack years: 15.00     Types: Cigarettes     Quit date: 1994     Years since quittin.7    Smokeless tobacco: Never Used   Substance Use Topics    Alcohol use: No       Family History   Problem Relation Age of Onset    Diabetes Mother     Diabetes Father     High Cholesterol Father            Review of Systems  Constitutional:  Negative for activity or appetite change, fever.  + fatigue  HENT:  Negative for congestion,sinus pressure, or rhinorrhea  Eyes:  Negative for eye pain or visual changes  Resp:  + SOB and cough  Cardiovascular: Negative for CP, palpitations, RG, orthopnea, PND, LE edema  Gastrointestinal: Negative for abd pain, melena, BRBPR, N/V/D  Endocrine:  Negative for polydipsia and polyuria  :  Negative for dysuria, flank pain or urinary frequency  Musculoskeletal:  Negative for back pain or myalgias  Neuro:  Negative for dizziness or lightheadedness  Psych: negative for depression or anxiety      Objective:   Constitutional:   · Reviewed vitals above  · Well Nourished, well developed, no distress       HENT:  · Normal external nose without lesions  · Normal nasal mucosa without swelling or erythema  Neck:  · Symmetric and without masses  Resp:  · Normal effort  · Clear to auscultation bilaterally without rhonchi, wheezing or crackles  Cardiovascular:  · On auscultation, normal S1 and S2 without murmurs, rubs or gallops  Musculoskeletal:  · Normal Gait  · All extremities without clubbing, cyanosis or edema  Skin:  · No rashes on inspection  · No areas of increased heat or induration on palpation  Psych:  · Normal mood and affect  · Normal insight and judgement

## 2021-09-23 NOTE — TELEPHONE ENCOUNTER
Called PT regarding Juanita's note. PT is scheduled for a red visit this afternoon @ 3:40 pm. Adv PT to call from her vehicle upon arrival and that [address] [place] [address] Road will come to her at that time.

## 2022-01-05 ENCOUNTER — OFFICE VISIT (OUTPATIENT)
Dept: FAMILY MEDICINE CLINIC | Age: 62
End: 2022-01-05
Payer: COMMERCIAL

## 2022-01-05 VITALS
OXYGEN SATURATION: 97 % | DIASTOLIC BLOOD PRESSURE: 84 MMHG | WEIGHT: 221 LBS | SYSTOLIC BLOOD PRESSURE: 130 MMHG | HEIGHT: 70 IN | HEART RATE: 85 BPM | BODY MASS INDEX: 31.64 KG/M2

## 2022-01-05 DIAGNOSIS — R53.83 FATIGUE, UNSPECIFIED TYPE: Primary | ICD-10-CM

## 2022-01-05 DIAGNOSIS — R00.2 PALPITATIONS: ICD-10-CM

## 2022-01-05 DIAGNOSIS — R06.02 SOB (SHORTNESS OF BREATH): ICD-10-CM

## 2022-01-05 LAB
A/G RATIO: 1.6 (ref 1.1–2.2)
ALBUMIN SERPL-MCNC: 4.4 G/DL (ref 3.4–5)
ALP BLD-CCNC: 89 U/L (ref 40–129)
ALT SERPL-CCNC: 24 U/L (ref 10–40)
ANION GAP SERPL CALCULATED.3IONS-SCNC: 14 MMOL/L (ref 3–16)
AST SERPL-CCNC: 21 U/L (ref 15–37)
BASOPHILS ABSOLUTE: 0 K/UL (ref 0–0.2)
BASOPHILS RELATIVE PERCENT: 0.6 %
BILIRUB SERPL-MCNC: 0.3 MG/DL (ref 0–1)
BUN BLDV-MCNC: 12 MG/DL (ref 7–20)
CALCIUM SERPL-MCNC: 10.1 MG/DL (ref 8.3–10.6)
CHLORIDE BLD-SCNC: 106 MMOL/L (ref 99–110)
CO2: 23 MMOL/L (ref 21–32)
CREAT SERPL-MCNC: 0.7 MG/DL (ref 0.6–1.1)
EOSINOPHILS ABSOLUTE: 0.1 K/UL (ref 0–0.6)
EOSINOPHILS RELATIVE PERCENT: 1.9 %
GFR AFRICAN AMERICAN: >60
GFR NON-AFRICAN AMERICAN: >60
GLUCOSE BLD-MCNC: 92 MG/DL (ref 70–99)
HCT VFR BLD CALC: 42.5 % (ref 36–48)
HEMOGLOBIN: 14.7 G/DL (ref 12–16)
LYMPHOCYTES ABSOLUTE: 1.7 K/UL (ref 1–5.1)
LYMPHOCYTES RELATIVE PERCENT: 33 %
MCH RBC QN AUTO: 31.8 PG (ref 26–34)
MCHC RBC AUTO-ENTMCNC: 34.7 G/DL (ref 31–36)
MCV RBC AUTO: 91.8 FL (ref 80–100)
MONOCYTES ABSOLUTE: 0.5 K/UL (ref 0–1.3)
MONOCYTES RELATIVE PERCENT: 10.3 %
NEUTROPHILS ABSOLUTE: 2.8 K/UL (ref 1.7–7.7)
NEUTROPHILS RELATIVE PERCENT: 54.2 %
PDW BLD-RTO: 12.5 % (ref 12.4–15.4)
PLATELET # BLD: 274 K/UL (ref 135–450)
PMV BLD AUTO: 8.6 FL (ref 5–10.5)
POTASSIUM SERPL-SCNC: 4.1 MMOL/L (ref 3.5–5.1)
RBC # BLD: 4.63 M/UL (ref 4–5.2)
SODIUM BLD-SCNC: 143 MMOL/L (ref 136–145)
TOTAL PROTEIN: 7.2 G/DL (ref 6.4–8.2)
TSH REFLEX: 1.03 UIU/ML (ref 0.27–4.2)
VITAMIN B-12: 1903 PG/ML (ref 211–911)
VITAMIN D 25-HYDROXY: 27.6 NG/ML
WBC # BLD: 5.2 K/UL (ref 4–11)

## 2022-01-05 PROCEDURE — 99214 OFFICE O/P EST MOD 30 MIN: CPT | Performed by: NURSE PRACTITIONER

## 2022-01-05 ASSESSMENT — PATIENT HEALTH QUESTIONNAIRE - PHQ9
SUM OF ALL RESPONSES TO PHQ QUESTIONS 1-9: 2
SUM OF ALL RESPONSES TO PHQ9 QUESTIONS 1 & 2: 2
1. LITTLE INTEREST OR PLEASURE IN DOING THINGS: 1
2. FEELING DOWN, DEPRESSED OR HOPELESS: 1
SUM OF ALL RESPONSES TO PHQ QUESTIONS 1-9: 2

## 2022-01-05 NOTE — PATIENT INSTRUCTIONS
Nicola Carter MD  [address] [facility], [address] [facility]  [phone]    Call [phone] to schedule heart monitor and sleep study.

## 2022-01-05 NOTE — PROGRESS NOTES
PROGRESS NOTE     Inez Loomis [address] [facility] Physicians  Shahzad Maza [address] [address]  [phone] office  [phone] fax    Date of Service:  1/5/2022     Assessment / Plan:     1. Fatigue, unspecified type  Patient had a moderate case of Covid back in September. I feel likely her symptoms of fatigue, hair loss and shortness of breath are long haul symptoms as they started after Covid. She is not vaccinated. Did recommend covid vaccine as its possible some of her symptoms may improve after. She is not interested at this time but may consider in the future. Will start evaluation with lab workup to rule out other causes such as anemia, hypothyroid, and kidney/liver dysfunction. Patient's watch shows that she snores and her oxygen drops to 88% at night. Will place order for sleep study. - CBC Auto Differential  - TSH with Reflex  - Vitamin D 25 Hydroxy  - Comprehensive Metabolic Panel  - VITAMIN B12  - Baseline Diagnostic Sleep Study; Future    2. Palpitations  Referring to cardiology and will start with holter monitor.   - Yamini Ackerman MD, Cardiology, [facility]  - Holter Monitor 24 Hour; Future    3. SOB (shortness of breath)  Lungs clear on exam today and oxygen 97%. Likely a combination of Covid and deconditioning. Subjective:      Patient ID: Syd Gordon is a 63 y.o. female      CC: SOB with activity, fatigue, hair loss     HPI  Fatigue:      She had Covid in September and has struggled with fatigue and worsened shortness of breath since that time. She has also noticed hair loss. TSH was just checked on 7/2/21 and normal at 0.81. She complains of palpitations on a daily basis. She states this has been happening for many years. She has a watch that can record an EKG. I did review her readings and it appears she has had occasional PVCs and 2 PACs recorded.      She did decrease her caffeine intake and has noticed a reduction in her palpitations. Her father  of an MI when he was 77. Vitals:    22 1125   BP: 130/84   Pulse: 85   SpO2: 97%   Weight: 221 lb (100.2 kg)   Height: 5' 10\" (1.778 m)       No outpatient medications have been marked as taking for the 22 encounter (Office Visit) with TRENTON Cummings CNP. 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 pelvic organs        Past Surgical History:   Procedure Laterality Date    ABDOMEN SURGERY      pelvic organ prolapse       Social History     Tobacco Use    Smoking status: Former Smoker     Packs/day: 1.00     Years: 15.00     Pack years: 15.00     Types: Cigarettes     Quit date: 1994     Years since quittin.0    Smokeless tobacco: Never Used   Substance Use Topics    Alcohol use: No       Family History   Problem Relation Age of Onset    Diabetes Mother     Diabetes Father     High Cholesterol Father            Review of Systems  Constitutional:  Negative for activity or appetite change, fever. + fatigue  HENT:  Negative for congestion,sinus pressure, or rhinorrhea  Eyes:  Negative for eye pain or visual changes  Resp:  Negative for chest tightness, cough. + SOB  Cardiovascular: Negative for CP, RG, orthopnea, PND, LE edema.  + palpitations  Gastrointestinal: Negative for abd pain, melena, BRBPR, N/V/D  Endocrine:  Negative for polydipsia and polyuria  :  Negative for dysuria, flank pain or urinary frequency  Musculoskeletal:  Negative for back pain or myalgias  Neuro:  Negative for dizziness or lightheadedness  Psych: negative for depression or anxiety      Objective:   Constitutional:   · Reviewed vitals above  · Well Nourished, well developed, no distress       HENT:  · Normal external nose without lesions  · Bilateral TMs translucent with normal light reflex and bony landmarks  · Normal oropharynx without erythema or exudate  · Normal nasal mucosa without swelling or erythema  Neck:  · Symmetric and without masses  · No thyromegaly  Resp:  · Normal effort  · Clear to auscultation bilaterally without rhonchi, wheezing or crackles  Cardiovascular:  · On auscultation, normal S1 and S2 without murmurs, rubs or gallops  · No bruits of bilateral carotids and no JVD  Gastrointestinal:  · Nontender, nondistended, and no masses  · No hepatosplenomegaly  Musculoskeletal:  · Normal Gait  · All extremities without clubbing, cyanosis or edema  Skin:  · No rashes on inspection  · No areas of increased heat or induration on palpation  Psych:  · Normal mood and affect  · Normal insight and judgement

## 2022-01-06 ENCOUNTER — TELEPHONE (OUTPATIENT)
Dept: FAMILY MEDICINE CLINIC | Age: 62
End: 2022-01-06

## 2022-01-06 DIAGNOSIS — R79.89 HIGH SERUM VITAMIN B12: Primary | ICD-10-CM

## 2022-01-06 NOTE — TELEPHONE ENCOUNTER
Received lab results: states Vit D low and B 12 was high. Wanting to know if there are any concerns? Anything she should be doing?  Please call Joanna back at [phone]

## 2022-01-07 NOTE — TELEPHONE ENCOUNTER
Vit d slightly low. I would recommend 2000 units of vitamin d daily for this. Is she taking a b12 supplement or multivitamin?

## 2022-01-10 NOTE — TELEPHONE ENCOUNTER
Pt called in I advised her of the notes and she stated she understood and would stop the multi vitamin and only take vit d and scheduled a 6 weeks lab visit

## 2022-01-10 NOTE — TELEPHONE ENCOUNTER
We mostly worry about a low b12 level--- we check b12 to rule out deficiency, but with her level being so high on a basic multivitamin, I would like to look into this further    I would like her to hold multivitamin for about 6 weeks and can then re-check level--- will have lab order in, I would like her to go ahead and start the vitamin d

## 2022-01-11 DIAGNOSIS — R00.2 PALPITATIONS: Primary | ICD-10-CM

## 2022-01-11 PROCEDURE — 93242 EXT ECG>48HR<7D RECORDING: CPT | Performed by: INTERNAL MEDICINE

## 2022-01-26 ENCOUNTER — OFFICE VISIT (OUTPATIENT)
Dept: CARDIOLOGY CLINIC | Age: 62
End: 2022-01-26
Payer: COMMERCIAL

## 2022-01-26 VITALS
HEART RATE: 88 BPM | DIASTOLIC BLOOD PRESSURE: 80 MMHG | OXYGEN SATURATION: 97 % | HEIGHT: 70 IN | BODY MASS INDEX: 31.61 KG/M2 | SYSTOLIC BLOOD PRESSURE: 118 MMHG | WEIGHT: 220.8 LBS

## 2022-01-26 DIAGNOSIS — R53.83 FATIGUE, UNSPECIFIED TYPE: ICD-10-CM

## 2022-01-26 DIAGNOSIS — Z86.79 HISTORY OF MITRAL VALVE PROLAPSE: ICD-10-CM

## 2022-01-26 DIAGNOSIS — Z82.49 FAMILY HISTORY OF CORONARY ARTERY DISEASE: ICD-10-CM

## 2022-01-26 DIAGNOSIS — R00.2 PALPITATIONS: Primary | ICD-10-CM

## 2022-01-26 DIAGNOSIS — R07.9 CHEST PAIN, UNSPECIFIED TYPE: ICD-10-CM

## 2022-01-26 DIAGNOSIS — R06.83 SNORING: ICD-10-CM

## 2022-01-26 PROCEDURE — 99204 OFFICE O/P NEW MOD 45 MIN: CPT | Performed by: INTERNAL MEDICINE

## 2022-01-26 PROCEDURE — 93244 EXT ECG>48HR<7D REV&INTERPJ: CPT | Performed by: INTERNAL MEDICINE

## 2022-01-26 PROCEDURE — 93000 ELECTROCARDIOGRAM COMPLETE: CPT | Performed by: INTERNAL MEDICINE

## 2022-01-26 NOTE — PROGRESS NOTES
[facility]  Cardiac Consult     Referring Provider:  Vj Oakley DO     Chief Complaint   Patient presents with    New Patient     F/U  5 day monitor    Palpitations        History of Present Illness:  63 y.o. female seen in consultation for palpitations and chest pain. She was evaluated in  by Dr. Benitez Douglass for palpitations with a normal stress myocview and ECHO with mild mitral prolapse. Monitor with PAC's and PVC's. She has done pretty well until the last few months when she again is noticing palpitations. They seem to mainly occur at night. Last a few seconds. No other exacerbating or relieving factors. No obvious associated symptoms. She does, however, have some left chest pain. Can be at rest or exertion. Mild-moderate intensity. She does have a strong FH CAD. Her PCP placed a monitor. It showed some short atrial tach for 3-5 beats    Past Medical History:   has a past medical history of Benign positional vertigo, Constipation, and Prolapse of female pelvic organs. Surgical History:   has a past surgical history that includes Abdomen surgery. Social History:  Social History     Tobacco Use    Smoking status: Former Smoker     Packs/day: 1.00     Years: 15.00     Pack years: 15.00     Types: Cigarettes     Quit date: 1994     Years since quittin.0    Smokeless tobacco: Never Used   Substance Use Topics    Alcohol use: No        Family History:  family history includes Diabetes in her father and mother; High Cholesterol in her father. Allergies:  Patient has no known allergies. Home Medications:  Prior to Visit Medications    Medication Sig Taking? Authorizing Provider   ondansetron (ZOFRAN) 4 MG tablet Take 1 tablet by mouth every 8 hours as needed for Nausea  TIERNEY Negro       [x] Medications and dosages reviewed.     ROS:  [x]Full ROS obtained and negative except as mentioned in HPI      Physical Examination:    Vitals:    22 1214   BP: 118/80   Site: Right Upper Arm   Position: Sitting   Cuff Size: Large Adult   Pulse: 88   SpO2: 97%   Weight: 220 lb 12.8 oz (100.2 kg)   Height: 5' 10\" (1.778 m)        · GENERAL: Well developed, well nourished, No acute distress  · NEUROLOGICAL: Alert and oriented  · PSYCH: Calm affect  · SKIN: Warm and dry, No visible rash,   · EYES: Pupils equal and round, Sclera non-icteric,   · HENT:  External ears and nose unremarkable, mucus membranes moist  · MUSCULOSKELETAL: Normal cephalic, neck supple  · CAROTID: Normal upstroke, no bruits  · CARDIAC: JVP normal, Normal PMI, regular rate and rhythm, normal S1S2, no murmur, rub, or gallop  · RESPIRATORY: Normal respiratory effort, clear to auscultation bilaterally  · EXTREMITIES: No LE edema  · GASTROINTESTINAL: normal bowel sounds, soft, non-tender, No hepatomegaly     ECHOs  2001, 2006, 2009  All with normal LV function and trivial-mild MR  2001 read as minor MVP, other two with no MVP      MYOVIEW 2009  Small anterior septal defect c/w breast attenuation    EKG today (tracings reviewed)  Sinus, non-specific ST changes precordial leads  Similar to prior    Assessment:       Palpitations:  Known PAC's  Follow  Usually occur in bed.  Suspect sleep apnea contributing    Fatigue  Possibly related to sleep apnea as she snores  Refer to sleep clinic    Snoring:  Sleep referral as above    Chest Pain:  Atypical, but FH CAD and abnormal EKG  Check stress ECHO    FH CAD:  Check lipids    H/O MVP:  Check on ECHO as above    Plan:  As above  Stress ECHO  Sleep referral  Check lipids    Thank you for allowing me to participate in the care of this individual.      Shelia Napier M.D., [facility]

## 2022-01-26 NOTE — PATIENT INSTRUCTIONS
Refer to sleep medicine  [facility], [address] [facility], [address] [place] [address] [address] [facility],[address]  [place], [address]  phone [phone] fax [phone]    Fasting labs for cholesterol  Stress echo test and office visit same day

## 2022-01-27 DIAGNOSIS — R07.9 CHEST PAIN, UNSPECIFIED TYPE: ICD-10-CM

## 2022-01-27 DIAGNOSIS — Z82.49 FAMILY HISTORY OF CORONARY ARTERY DISEASE: ICD-10-CM

## 2022-01-27 LAB
CHOLESTEROL, FASTING: 211 MG/DL (ref 0–199)
HDLC SERPL-MCNC: 51 MG/DL (ref 40–60)
LDL CHOLESTEROL CALCULATED: 140 MG/DL
TRIGLYCERIDE, FASTING: 102 MG/DL (ref 0–150)
VLDLC SERPL CALC-MCNC: 20 MG/DL

## 2022-01-28 ENCOUNTER — TELEPHONE (OUTPATIENT)
Dept: CARDIOLOGY CLINIC | Age: 62
End: 2022-01-28

## 2022-01-28 NOTE — TELEPHONE ENCOUNTER
Spoke to patient about lipid panel. FERNANDA would like to discuss further and in more detail at upcoming [facility]. Patient agreeable.        ----- Message from Liudmila Colbert RN sent at 1/28/2022 12:28 PM EST -----  FERNANAD reviewed. Cholesterol a little high.  Will discuss with her at [facility] in Feb

## 2022-02-15 ENCOUNTER — TELEPHONE (OUTPATIENT)
Dept: CARDIOLOGY CLINIC | Age: 62
End: 2022-02-15

## 2022-02-15 NOTE — TELEPHONE ENCOUNTER
Pt states she does not have [employer] any longer, only has [employer]. Asking if authorization is approved for 2/17/22 stress test at [facility]. Please call pt to advise.

## 2022-02-15 NOTE — TELEPHONE ENCOUNTER
Spoke w/Asia Christianson regarding prior auth for stress [facility]. Per Hinda Kanner, prior auth not required. Notified patient.

## 2022-02-17 ENCOUNTER — PROCEDURE VISIT (OUTPATIENT)
Dept: CARDIOLOGY CLINIC | Age: 62
End: 2022-02-17
Payer: OTHER GOVERNMENT

## 2022-02-17 ENCOUNTER — OFFICE VISIT (OUTPATIENT)
Dept: CARDIOLOGY CLINIC | Age: 62
End: 2022-02-17
Payer: OTHER GOVERNMENT

## 2022-02-17 VITALS
WEIGHT: 220 LBS | DIASTOLIC BLOOD PRESSURE: 80 MMHG | HEIGHT: 70 IN | SYSTOLIC BLOOD PRESSURE: 122 MMHG | HEART RATE: 93 BPM | BODY MASS INDEX: 31.5 KG/M2 | OXYGEN SATURATION: 98 %

## 2022-02-17 DIAGNOSIS — R06.83 SNORING: ICD-10-CM

## 2022-02-17 DIAGNOSIS — R00.2 PALPITATIONS: ICD-10-CM

## 2022-02-17 DIAGNOSIS — R07.9 CHEST PAIN, UNSPECIFIED TYPE: ICD-10-CM

## 2022-02-17 DIAGNOSIS — Z86.79 HISTORY OF MITRAL VALVE PROLAPSE: ICD-10-CM

## 2022-02-17 DIAGNOSIS — Z82.49 FAMILY HISTORY OF CORONARY ARTERY DISEASE: ICD-10-CM

## 2022-02-17 DIAGNOSIS — R07.9 CHEST PAIN, UNSPECIFIED TYPE: Primary | ICD-10-CM

## 2022-02-17 LAB
LV EF: 50 %
LVEF MODALITY: NORMAL

## 2022-02-17 PROCEDURE — 93351 STRESS TTE COMPLETE: CPT | Performed by: INTERNAL MEDICINE

## 2022-02-17 PROCEDURE — 99214 OFFICE O/P EST MOD 30 MIN: CPT | Performed by: INTERNAL MEDICINE

## 2022-02-17 PROCEDURE — 93320 DOPPLER ECHO COMPLETE: CPT | Performed by: INTERNAL MEDICINE

## 2022-02-17 PROCEDURE — 93325 DOPPLER ECHO COLOR FLOW MAPG: CPT | Performed by: INTERNAL MEDICINE

## 2022-02-17 NOTE — PROGRESS NOTES
[facility]  Cardiac Consult     Referring Provider:  Carlos Parekh DO     Chief Complaint   Patient presents with    Palpitations        History of Present Illness:  63 y.o. female seen in consultation for palpitations and chest pain. She was evaluated in  by Dr. Polina Kennedy for palpitations with a normal stress myocview and ECHO with mild mitral prolapse. Monitor with PAC's and PVC's. She has done pretty well until the last few months when she again is noticing palpitations. They seem to mainly occur at night. Last a few seconds. No other exacerbating or relieving factors. No obvious associated symptoms. She does, however, have some left chest pain. Can be at rest or exertion. Mild-moderate intensity. She does have a strong FH CAD. Her PCP placed a monitor. It showed some short atrial tach for 3-5 beats. She returns today for stress ECHO that is normal. She has not scheduled appointment with [facility] medicine. Past Medical History:   has a past medical history of Benign positional vertigo, Constipation, and Prolapse of female pelvic organs. Surgical History:   has a past surgical history that includes Abdomen surgery. Social History:  Social History     Tobacco Use    Smoking status: Former Smoker     Packs/day: 1.00     Years: 15.00     Pack years: 15.00     Types: Cigarettes     Quit date: 1994     Years since quittin.1    Smokeless tobacco: Never Used   Substance Use Topics    Alcohol use: No        Family History:  family history includes Diabetes in her father and mother; High Cholesterol in her father. Allergies:  Patient has no known allergies. Home Medications:  Prior to Visit Medications    Medication Sig Taking? Authorizing Provider   ondansetron (ZOFRAN) 4 MG tablet Take 1 tablet by mouth every 8 hours as needed for Nausea  TIERNEY Johnson       [x] Medications and dosages reviewed.     ROS:  [x]Full ROS obtained and negative except as mentioned in HPI      Physical Examination:    Vitals:    02/17/22 1340   BP: 122/80   Site: Left Upper Arm   Position: Sitting   Cuff Size: Medium Adult   Pulse: 93   SpO2: 98%   Weight: 220 lb (99.8 kg)   Height: 5' 10\" (1.778 m)        · GENERAL: Well developed, well nourished, No acute distress  · NEUROLOGICAL: Alert and oriented  · PSYCH: Calm affect  · SKIN: Warm and dry, No visible rash,   · EYES: Pupils equal and round, Sclera non-icteric,   · HENT:  External ears and nose unremarkable, mucus membranes moist  · MUSCULOSKELETAL: Normal cephalic, neck supple  · CAROTID: Normal upstroke, no bruits  · CARDIAC: JVP normal, Normal PMI, regular rate and rhythm, normal S1S2, no murmur, rub, or gallop  · RESPIRATORY: Normal respiratory effort, clear to auscultation bilaterally  · EXTREMITIES: No LE edema  · GASTROINTESTINAL: normal bowel sounds, soft, non-tender, No hepatomegaly     ECHOs  2001, 2006, 2009  All with normal LV function and trivial-mild MR  2001 read as minor MVP, other two with no MVP      MYOVIEW 2009  Small anterior septal defect c/w breast attenuation      STRESS ECHO   Normal left ventricle size, wall thickness and systolic function with an   estimated ejection fraction of 60%. No regional wall motion abnormalities   are seen. E/e\"= 8. Diastolic filling parameters suggests normal diastolic function. Trivial tricuspid regurgitation. RVSP 23mmHg. IVC size is normal (<2.1cm) and collapses > 50% with respiration consistent   with normal RA pressure (3mmHg). Normal stress echocardiogram study. 92% of predicted HR. There was stress induced shortness of breath 8/10. Peak pulse ox 98%. No chest pain. Assessment:       Palpitations:  Known PAC's  Follow  Usually occur in bed.  Suspect sleep apnea contributing  Plan sleep eval    Fatigue  Possibly related to sleep apnea as she snores  Refer to sleep clinic    Snoring:  Sleep referral as above    Chest Pain:  Atypical, but FH CAD and abnormal EKG  Stress ECHO normal. Non cardiac    FH CAD:  NOM=004  10 year risk 3.5%  Discussed options, she would like to modify with diet and exercise    H/O MVP:  Not on ECHO today    Plan:  Exercise, weight loss  Sleep evaluation  She can f/u me prn  She has PCP f/u next month    Thank you for allowing me to participate in the care of this individual.      Nathanael Maharaj M.D., [address] [facility]

## 2022-02-21 ENCOUNTER — NURSE ONLY (OUTPATIENT)
Dept: FAMILY MEDICINE CLINIC | Age: 62
End: 2022-02-21
Payer: OTHER GOVERNMENT

## 2022-02-21 DIAGNOSIS — R79.89 HIGH SERUM VITAMIN B12: ICD-10-CM

## 2022-02-21 LAB — VITAMIN B-12: 1449 PG/ML (ref 211–911)

## 2022-02-21 PROCEDURE — 36415 COLL VENOUS BLD VENIPUNCTURE: CPT | Performed by: FAMILY MEDICINE

## 2022-02-21 PROCEDURE — 99999 PR OFFICE/OUTPT VISIT,PROCEDURE ONLY: CPT | Performed by: FAMILY MEDICINE

## 2022-03-03 ENCOUNTER — OFFICE VISIT (OUTPATIENT)
Dept: FAMILY MEDICINE CLINIC | Age: 62
End: 2022-03-03

## 2022-03-03 ENCOUNTER — TELEPHONE (OUTPATIENT)
Dept: FAMILY MEDICINE CLINIC | Age: 62
End: 2022-03-03

## 2022-03-03 VITALS
SYSTOLIC BLOOD PRESSURE: 131 MMHG | HEIGHT: 70 IN | OXYGEN SATURATION: 97 % | BODY MASS INDEX: 31.5 KG/M2 | DIASTOLIC BLOOD PRESSURE: 84 MMHG | HEART RATE: 86 BPM | WEIGHT: 220 LBS

## 2022-03-03 DIAGNOSIS — R79.82 CRP ELEVATED: ICD-10-CM

## 2022-03-03 DIAGNOSIS — I67.89 CEREBRAL MICROVASCULAR DISEASE: Primary | ICD-10-CM

## 2022-03-03 DIAGNOSIS — R06.09 OTHER FORM OF DYSPNEA: ICD-10-CM

## 2022-03-03 DIAGNOSIS — R25.3 TWITCHING: ICD-10-CM

## 2022-03-03 LAB
C-REACTIVE PROTEIN: 7.3 MG/L (ref 0–5.1)
SEDIMENTATION RATE, ERYTHROCYTE: 24 MM/HR (ref 0–30)

## 2022-03-03 NOTE — TELEPHONE ENCOUNTER
Order was placed for MRI of brain with contrast. States the order needs to be with and without contrast. Needs to have a new order for this.  Please add order

## 2022-03-04 LAB — ANTI-NUCLEAR ANTIBODY (ANA): NEGATIVE

## 2022-03-05 LAB — LYME, EIA: 0.27 LIV (ref 0–1.2)

## 2022-03-08 ENCOUNTER — TELEMEDICINE (OUTPATIENT)
Dept: FAMILY MEDICINE CLINIC | Age: 62
End: 2022-03-08
Payer: OTHER GOVERNMENT

## 2022-03-08 DIAGNOSIS — R39.9 URINARY SYMPTOM OR SIGN: ICD-10-CM

## 2022-03-08 DIAGNOSIS — R39.9 UTI SYMPTOMS: Primary | ICD-10-CM

## 2022-03-08 PROCEDURE — 99213 OFFICE O/P EST LOW 20 MIN: CPT | Performed by: FAMILY MEDICINE

## 2022-03-08 RX ORDER — CEPHALEXIN 500 MG/1
500 CAPSULE ORAL 2 TIMES DAILY
Qty: 14 CAPSULE | Refills: 0 | Status: SHIPPED | OUTPATIENT
Start: 2022-03-08 | End: 2022-03-15

## 2022-03-08 NOTE — PROGRESS NOTES
Appointment was done audiovisually. Patient gave consent for an audiovisual visit. Patient in [facility] for appointment. A/P:    Diagnosis Orders   1. UTI symptoms     2. Urinary symptom or sign       UTI suspected. I feel she developed a URI when her immune system was down from her UTI. Keflex 500 mg twice daily x7 days prescribed. Supportive care recommended for her URI symptoms. Patient to call or be evaluated if symptoms worsen or fail to improve/resolve. O: LMP 02/01/2009    Gen- NAD, pleasant  HEENT- Eyes without icterus or injection  Lungs- no increased work of breathing appreciated  Psych- Appropriate    S: CC-possible UTI, URI  HPI-patient reports starting with urinary urgency, urinary frequency, dysuria 5 days ago. She denies associated fever, flank pain. She reports her last UTI was about 30 years ago. She then started with sore throat, headache, nasal congestion 2 days ago. She reports she did home Covid test which was negative.     ROS- Per HPI    Patient's medications, allergies, and past medical hx were reviewed

## 2022-03-14 ENCOUNTER — HOSPITAL ENCOUNTER (OUTPATIENT)
Dept: PULMONOLOGY | Age: 62
Discharge: HOME | End: 2022-03-14
Payer: OTHER GOVERNMENT

## 2022-03-14 ENCOUNTER — HOSPITAL ENCOUNTER (OUTPATIENT)
Dept: MRI IMAGING | Age: 62
Discharge: HOME | End: 2022-03-14
Payer: OTHER GOVERNMENT

## 2022-03-14 ENCOUNTER — HOSPITAL ENCOUNTER (OUTPATIENT)
Dept: GENERAL RADIOLOGY | Age: 62
Discharge: HOME | End: 2022-03-14
Payer: OTHER GOVERNMENT

## 2022-03-14 DIAGNOSIS — R06.09 OTHER FORM OF DYSPNEA: ICD-10-CM

## 2022-03-14 DIAGNOSIS — R79.82 CRP ELEVATED: ICD-10-CM

## 2022-03-14 DIAGNOSIS — R25.3 TWITCHING: ICD-10-CM

## 2022-03-14 DIAGNOSIS — I67.89 CEREBRAL MICROVASCULAR DISEASE: ICD-10-CM

## 2022-03-14 LAB
DLCO %PRED: 83 %
DLCO PRED: NORMAL
DLCO/VA %PRED: 78 %
DLCO/VA PRED: 4.21 ML/MIN/MMHG
DLCO/VA: NORMAL
DLCO: NORMAL
EXPIRATORY TIME-POST: NORMAL
EXPIRATORY TIME: NORMAL
FEF 25-75% %CHNG: NORMAL
FEF 25-75% %PRED-POST: NORMAL
FEF 25-75% %PRED-PRE: NORMAL
FEF 25-75% PRED: NORMAL
FEF 25-75%-POST: NORMAL
FEF 25-75%-PRE: NORMAL
FEV1 %PRED-POST: 105 %
FEV1 %PRED-PRE: 106 %
FEV1 PRED: NORMAL
FEV1-POST: NORMAL
FEV1-PRE: NORMAL
FEV1/FVC %PRED-POST: NORMAL
FEV1/FVC %PRED-PRE: NORMAL
FEV1/FVC PRED: NORMAL
FEV1/FVC-POST: 83 %
FEV1/FVC-PRE: 81 %
FVC %PRED-POST: NORMAL
FVC %PRED-PRE: NORMAL
FVC PRED: NORMAL
FVC-POST: NORMAL
FVC-PRE: NORMAL
GAW %PRED: NORMAL
GAW PRED: NORMAL
GAW: NORMAL
IC %PRED: NORMAL
IC PRED: NORMAL
IC: NORMAL
MEP: NORMAL
MIP: NORMAL
MVV %PRED-PRE: NORMAL
MVV PRED: NORMAL
MVV-PRE: NORMAL
PEF %PRED-POST: NORMAL
PEF %PRED-PRE: NORMAL
PEF PRED: NORMAL
PEF%CHNG: NORMAL
PEF-POST: NORMAL
PEF-PRE: NORMAL
RAW %PRED: NORMAL
RAW PRED: NORMAL
RAW: NORMAL
RV %PRED: NORMAL
RV PRED: NORMAL
RV: NORMAL
SVC %PRED: NORMAL
SVC PRED: NORMAL
SVC: NORMAL
TLC %PRED: 109 %
TLC PRED: NORMAL
TLC: NORMAL
VA %PRED: 106 %
VA PRED: NORMAL
VA: 6.28 L
VTG %PRED: NORMAL
VTG PRED: NORMAL
VTG: NORMAL

## 2022-03-14 PROCEDURE — A9577 INJ MULTIHANCE: HCPCS | Performed by: FAMILY MEDICINE

## 2022-03-14 PROCEDURE — 94060 EVALUATION OF WHEEZING: CPT

## 2022-03-14 PROCEDURE — 6370000000 HC RX 637 (ALT 250 FOR IP): Performed by: FAMILY MEDICINE

## 2022-03-14 PROCEDURE — 6360000004 HC RX CONTRAST MEDICATION: Performed by: FAMILY MEDICINE

## 2022-03-14 PROCEDURE — 94726 PLETHYSMOGRAPHY LUNG VOLUMES: CPT

## 2022-03-14 PROCEDURE — 70553 MRI BRAIN STEM W/O & W/DYE: CPT

## 2022-03-14 PROCEDURE — 71046 X-RAY EXAM CHEST 2 VIEWS: CPT

## 2022-03-14 PROCEDURE — 94664 DEMO&/EVAL PT USE INHALER: CPT

## 2022-03-14 PROCEDURE — 94640 AIRWAY INHALATION TREATMENT: CPT

## 2022-03-14 PROCEDURE — 94729 DIFFUSING CAPACITY: CPT

## 2022-03-14 RX ORDER — ALBUTEROL SULFATE 90 UG/1
4 AEROSOL, METERED RESPIRATORY (INHALATION) ONCE
Status: COMPLETED | OUTPATIENT
Start: 2022-03-14 | End: 2022-03-14

## 2022-03-14 RX ADMIN — GADOBENATE DIMEGLUMINE 20 ML: 529 INJECTION, SOLUTION INTRAVENOUS at 09:14

## 2022-03-14 RX ADMIN — ALBUTEROL SULFATE 4 PUFF: 90 AEROSOL, METERED RESPIRATORY (INHALATION) at 09:33

## 2022-03-14 ASSESSMENT — PULMONARY FUNCTION TESTS
FEV1_PERCENT_PREDICTED_PRE: 106
FEV1/FVC_POST: 83
FEV1_PERCENT_PREDICTED_POST: 105
FEV1/FVC_PRE: 81

## 2022-03-15 PROCEDURE — 94726 PLETHYSMOGRAPHY LUNG VOLUMES: CPT | Performed by: INTERNAL MEDICINE

## 2022-03-15 PROCEDURE — 94729 DIFFUSING CAPACITY: CPT | Performed by: INTERNAL MEDICINE

## 2022-03-15 PROCEDURE — 94060 EVALUATION OF WHEEZING: CPT | Performed by: INTERNAL MEDICINE

## 2022-03-15 NOTE — PROCEDURES
Reason for PFT:  Dyspnea     Spirometry  1. Spirometry is normal.  2. There is no demonstrable obstructive defect. Lung Volumes  3. Lung volumes are normal.    Bronchodilator  1. There is no response to bronchodilator demonstrated. [Increase in FEV1 and/or FVC => 12% of control and => 200 ml]    Flow-Volume Loop  4. The flow-volume loop is normal.      Overall Interpretation  No obstruction is present    No restriction is present    There is not a bronchodilator response present    Diffusion capacity is normal    FEV1 is 3.17 L at 106% predicted. FVC is 3.90 L at 102% predicted    FEV1/FVC ratio is 81    Normal study. OBSTRUCTION % Predicted FEV1   MILD >70%   MODERATE 60-69%   MODERATELY-SEVERE 50-59%   SEVERE 35-49%   VERY SEVERE <35%         RESTRICTION % Predicted TLC   MILD Less than LLN but > than 70%   MODERATE 60-69%   MODERATELY-SEVERE <60%                 DIFFUSION CAPACITY DL,CO % Pred   MILD >60% AND < LLN   MODERATE 40-60%   SEVERE <40%       PFT data will be scanned into the media tab in [facility].     Yasmine Carmona [address] Pulmonology

## 2022-10-11 ENCOUNTER — TELEPHONE (OUTPATIENT)
Dept: FAMILY MEDICINE CLINIC | Age: 62
End: 2022-10-11

## 2022-10-11 DIAGNOSIS — Z12.31 SCREENING MAMMOGRAM FOR BREAST CANCER: ICD-10-CM

## 2022-10-11 DIAGNOSIS — Z12.11 SCREEN FOR COLON CANCER: Primary | ICD-10-CM

## 2023-02-22 RX ORDER — CYANOCOBALAMIN (VITAMIN B-12) 500 MCG
TABLET ORAL
COMMUNITY

## 2023-02-22 NOTE — PROGRESS NOTES
[facility] ENDOSCOPY COLONOSCOPY PRE-OPERATIVE INSTRUCTIONS    Procedure date_03/02/2023________Arrival time___0800_________        Surgery time____0900________       Clear liquids the day before the procedure. Do not eat or drink anything within 5 hours of your procedure. This includes water chewing gum, mints and ice chips. You may brush your teeth and gargle the morning of your surgery, but do not swallow the water    You may be asked to stop blood thinners such as Coumadin, Plavix, Fragmin, Lovenox, etc., or any anti-inflammatories such as:  Aspirin, Ibuprofen, Advil, Naproxen prior to your procedure. We also ask that you stop any OTC medications such as fish oil, vitamin E, glucosamine, garlic, Multivitamins, COQ 10, etc.    You must make arrangements for a responsible adult to arrive with you and stay in our waiting area during your procedure. They will also need to take you home after your procedure. For your safety you will not be allowed to leave alone or drive yourself home. Also for your safety, it is strongly suggested that someone stay with you the first 24 hours after your procedure. For your comfort, please wear simple loose fitting clothing to the center. Please do not bring valuables. If you have a living will and a durable power of  for healthcare, please bring in a copy.      You will need to bring a photo ID and insurance card    Our goal is to provide you with excellent care so if you have any questions, please contact us at the [facility] at [phone]         Please note these are generalized instructions for all colonoscopy cases, you may be provided with more specific instructions if necessary

## 2023-02-27 ENCOUNTER — TELEPHONE (OUTPATIENT)
Dept: FAMILY MEDICINE CLINIC | Age: 63
End: 2023-02-27

## 2023-02-28 ENCOUNTER — ANESTHESIA EVENT (OUTPATIENT)
Dept: ENDOSCOPY | Age: 63
End: 2023-02-28
Payer: OTHER GOVERNMENT

## 2023-03-02 ENCOUNTER — HOSPITAL ENCOUNTER (OUTPATIENT)
Age: 63
Setting detail: OUTPATIENT SURGERY
Discharge: HOME | End: 2023-03-02
Attending: INTERNAL MEDICINE | Admitting: INTERNAL MEDICINE
Payer: OTHER GOVERNMENT

## 2023-03-02 ENCOUNTER — ANESTHESIA (OUTPATIENT)
Dept: ENDOSCOPY | Age: 63
End: 2023-03-02
Payer: OTHER GOVERNMENT

## 2023-03-02 VITALS
BODY MASS INDEX: 30.78 KG/M2 | DIASTOLIC BLOOD PRESSURE: 80 MMHG | RESPIRATION RATE: 16 BRPM | HEART RATE: 65 BPM | SYSTOLIC BLOOD PRESSURE: 111 MMHG | TEMPERATURE: 97.9 F | WEIGHT: 215 LBS | OXYGEN SATURATION: 95 % | HEIGHT: 70 IN

## 2023-03-02 PROCEDURE — 7100000011 HC PHASE II RECOVERY - ADDTL 15 MIN: Performed by: INTERNAL MEDICINE

## 2023-03-02 PROCEDURE — 2580000003 HC RX 258: Performed by: ANESTHESIOLOGY

## 2023-03-02 PROCEDURE — 3700000000 HC ANESTHESIA ATTENDED CARE: Performed by: INTERNAL MEDICINE

## 2023-03-02 PROCEDURE — 3609027000 HC COLONOSCOPY: Performed by: INTERNAL MEDICINE

## 2023-03-02 PROCEDURE — 3700000001 HC ADD 15 MINUTES (ANESTHESIA): Performed by: INTERNAL MEDICINE

## 2023-03-02 PROCEDURE — 7100000010 HC PHASE II RECOVERY - FIRST 15 MIN: Performed by: INTERNAL MEDICINE

## 2023-03-02 PROCEDURE — 6360000002 HC RX W HCPCS: Performed by: NURSE ANESTHETIST, CERTIFIED REGISTERED

## 2023-03-02 RX ORDER — SODIUM CHLORIDE 0.9 % (FLUSH) 0.9 %
5-40 SYRINGE (ML) INJECTION PRN
Status: DISCONTINUED | OUTPATIENT
Start: 2023-03-02 | End: 2023-03-02 | Stop reason: HOSPADM

## 2023-03-02 RX ORDER — SODIUM CHLORIDE 0.9 % (FLUSH) 0.9 %
5-40 SYRINGE (ML) INJECTION EVERY 12 HOURS SCHEDULED
Status: CANCELLED | OUTPATIENT
Start: 2023-03-02

## 2023-03-02 RX ORDER — SODIUM CHLORIDE 9 MG/ML
INJECTION, SOLUTION INTRAVENOUS PRN
Status: DISCONTINUED | OUTPATIENT
Start: 2023-03-02 | End: 2023-03-02 | Stop reason: HOSPADM

## 2023-03-02 RX ORDER — SODIUM CHLORIDE 0.9 % (FLUSH) 0.9 %
5-40 SYRINGE (ML) INJECTION EVERY 12 HOURS SCHEDULED
Status: DISCONTINUED | OUTPATIENT
Start: 2023-03-02 | End: 2023-03-02 | Stop reason: HOSPADM

## 2023-03-02 RX ORDER — PROPOFOL 10 MG/ML
INJECTION, EMULSION INTRAVENOUS PRN
Status: DISCONTINUED | OUTPATIENT
Start: 2023-03-02 | End: 2023-03-02 | Stop reason: SDUPTHER

## 2023-03-02 RX ORDER — SODIUM CHLORIDE 0.9 % (FLUSH) 0.9 %
5-40 SYRINGE (ML) INJECTION PRN
Status: CANCELLED | OUTPATIENT
Start: 2023-03-02

## 2023-03-02 RX ORDER — SODIUM CHLORIDE 9 MG/ML
INJECTION, SOLUTION INTRAVENOUS PRN
Status: CANCELLED | OUTPATIENT
Start: 2023-03-02

## 2023-03-02 RX ORDER — ONDANSETRON 2 MG/ML
4 INJECTION INTRAMUSCULAR; INTRAVENOUS
Status: CANCELLED | OUTPATIENT
Start: 2023-03-02 | End: 2023-03-03

## 2023-03-02 RX ADMIN — PROPOFOL 20 MG: 10 INJECTION, EMULSION INTRAVENOUS at 09:05

## 2023-03-02 RX ADMIN — PROPOFOL 20 MG: 10 INJECTION, EMULSION INTRAVENOUS at 08:58

## 2023-03-02 RX ADMIN — PROPOFOL 20 MG: 10 INJECTION, EMULSION INTRAVENOUS at 09:09

## 2023-03-02 RX ADMIN — PROPOFOL 20 MG: 10 INJECTION, EMULSION INTRAVENOUS at 09:00

## 2023-03-02 RX ADMIN — PROPOFOL 20 MG: 10 INJECTION, EMULSION INTRAVENOUS at 08:56

## 2023-03-02 RX ADMIN — SODIUM CHLORIDE: 9 INJECTION, SOLUTION INTRAVENOUS at 08:37

## 2023-03-02 RX ADMIN — PROPOFOL 20 MG: 10 INJECTION, EMULSION INTRAVENOUS at 09:13

## 2023-03-02 RX ADMIN — PROPOFOL 20 MG: 10 INJECTION, EMULSION INTRAVENOUS at 09:11

## 2023-03-02 RX ADMIN — PROPOFOL 20 MG: 10 INJECTION, EMULSION INTRAVENOUS at 09:03

## 2023-03-02 RX ADMIN — PROPOFOL 20 MG: 10 INJECTION, EMULSION INTRAVENOUS at 09:02

## 2023-03-02 RX ADMIN — PROPOFOL 20 MG: 10 INJECTION, EMULSION INTRAVENOUS at 09:06

## 2023-03-02 RX ADMIN — PROPOFOL 20 MG: 10 INJECTION, EMULSION INTRAVENOUS at 09:07

## 2023-03-02 RX ADMIN — PROPOFOL 100 MG: 10 INJECTION, EMULSION INTRAVENOUS at 08:55

## 2023-03-02 RX ADMIN — PROPOFOL 20 MG: 10 INJECTION, EMULSION INTRAVENOUS at 09:01

## 2023-03-02 RX ADMIN — PROPOFOL 20 MG: 10 INJECTION, EMULSION INTRAVENOUS at 09:04

## 2023-03-02 ASSESSMENT — PAIN - FUNCTIONAL ASSESSMENT: PAIN_FUNCTIONAL_ASSESSMENT: 0-10

## 2023-03-02 NOTE — H&P
Pre-operative History and Physical    Patient: Karissa Almodovar  : 1960  Acct#:     Intended Procedure:  Colonoscopy    HISTORY OF PRESENT ILLNESS:  The patient is a 63 y.o. female  who presents for/due to Screening      Past Medical History:        Diagnosis Date    Benign positional vertigo     Constipation     Prolapse of female pelvic organs     Shortness of breath     Twitching      Past Surgical History:        Procedure Laterality Date    ABDOMEN SURGERY      pelvic organ prolapse    COLONOSCOPY       Medications Prior to Admission:   Prior to Admission medications    Medication Sig Start Date End Date Taking? Authorizing Provider   MULTIPLE VITAMIN PO Take by mouth   Yes Historical Provider, MD   Cyanocobalamin (VITAMIN B 12) 500 MCG TABS Take by mouth   Yes Historical Provider, MD   ondansetron (ZOFRAN) 4 MG tablet Take 1 tablet by mouth every 8 hours as needed for Nausea 3/2/18 10/10/19  TIERNEY Peacock       Allergies:  Patient has no known allergies. Social History:   TOBACCO:   reports that she quit smoking about 29 years ago. Her smoking use included cigarettes. She has a 15.00 pack-year smoking history. She has never used smokeless tobacco.  ETOH:   reports no history of alcohol use. DRUGS:   reports no history of drug use. PHYSICAL EXAM:      Vital Signs: /77   Pulse 87   Temp 98.3 °F (36.8 °C) (Temporal)   Resp 16   Ht 5' 10\" (1.778 m)   Wt 215 lb (97.5 kg)   LMP 2009   SpO2 94%   BMI 30.85 kg/m²    Airway: No stridor or wheezing noted. Good air movement  Pulmonary: without wheezes.   Clear to auscultation  Cardiac:regular rate and rhythm without loud murmurs  Abdomen:soft, nontender,  Bowel sounds present    Pre-Procedure Assessment / Plan:  1) Colonoscopy    ASA Grade:  ASA 2 - Patient with mild systemic disease with no functional limitations  Mallampati Classification:  Class III    Level of Sedation Plan:Deep sedation    Post Procedure plan: Return to same level of care    I assessed the patient and find that the patient is in satisfactory condition to proceed with the planned procedure and sedation plan. I have explained the risk, benefits, and alternatives to the procedure; the patient understands and agrees to proceed.        Zen Wesley MD  3/2/2023

## 2023-03-02 NOTE — DISCHARGE INSTRUCTIONS
Recommendations:    Recommend repeat colonoscopy in 10 years for screening purposes. Discharge Instructions for Colonoscopy     Colonoscopy is a visual exam of the lining of the large intestine, also called the bowel or colon, with a colonoscope. A colonoscope is a flexible tube with a light and a viewing device. It allows the doctor to view the inside of the colon through a tiny video camera. Colonoscopy is performed for many reasons: unexplained anemia , pain, diarrhea , bloody stools, cancer screening, among many other reasons. Complications from a colonoscopy are rare. Some possible serious complications include perforated bowel (which might require surgery) and bleeding (which could require blood transfusion ). Minor complications include bloating, gas, and cramping that can last for 1-2 days after the procedure. Because air is put into your colon during the procedure, it is normal to pass large amounts of air from your rectum. You may not have a bowel movement for 1-3 days after the procedure. What You Will Need:  Someone to drive you home after the procedure     Steps to Take:  [address] when you get home. Because the sedative will make you drowsy, don't drive, operate machinery, or make important decisions the day of the procedure. Feelings of bloating, gas, or cramping may persist for 24 hours. Diet -  Try sips of water first. If tolerated, resume bland food (scrambled eggs, toast, soup) first.  If tolerated, resume regular diet or the diet recommended by your physician. Do not drink alcohol for 24 hours. Physical Activity -  Ask your doctor when you will be able to return to work. Do not drive, operate heavy machinery, or do activities that require coordination or balance for 24 hours. Otherwise, return to your normal routine as soon as you are comfortable to do so, which is usually the next day after the procedure.    Medications - When taking medications, it's important to: Take your medication as directed, not more, not less, not at a different time. Do not stop taking them without consulting your healthcare provider. Don't share them with anyone else. Know what effects and side effects to expect, and report them to your healthcare provider. If you are taking more than one drug, even if it is an over-the-counter medication, herb, or dietary supplement, be sure to check with a physician or pharmacist about drug interactions. Plan ahead for refills so you don't run out. Lifestyle Changes - The results of your colonoscopy will determine if any lifestyle changes are necessary. Follow-up:  The doctor will usually give you a preliminary report after the medication wears off and you are more alert. The results from a biopsy can take as long as 1-2 weeks to be completed. Schedule a follow-up appointment as directed by your doctor. You should schedule a follow-up colonoscopy as recommended by your doctor. Call Your Doctor If Any of the Following Occurs:  Bleeding from your rectum; notify your doctor if you pass a teaspoonful or more of blood   Black, tarry stools   Severe abdominal pain   Hard, swollen abdomen   Signs of infection, including fever or chills   Inability to pass gas or stool   Coughing, shortness of breath, chest pain, severe nausea or vomiting     In case of an emergency, call 911 immediately.

## 2023-03-02 NOTE — ANESTHESIA PRE PROCEDURE
[facility] Department of Anesthesiology  Pre-Anesthesia Evaluation/Consultation       Name:  Penelope Lagos  : 1960  Age:  63 y.o. MRN:  [id number]  Date: 3/2/2023           Surgeon: Surgeon(s):  Charli Ruth MD    Procedure: Procedure(s):  COLORECTAL CANCER SCREENING, NOT HIGH RISK     No Known Allergies  Patient Active Problem List   Diagnosis    Benign positional vertigo    Palpitations    Family history of coronary artery disease    History of mitral valve prolapse    Dyspnea     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 pelvic organs     Shortness of breath     Twitching      Past Surgical History:   Procedure Laterality Date    ABDOMEN SURGERY      pelvic organ prolapse    COLONOSCOPY       Social History     Tobacco Use    Smoking status: Former     Packs/day: 1.00     Years: 15.00     Pack years: 15.00     Types: Cigarettes     Quit date: 1994     Years since quittin.1    Smokeless tobacco: Never   Vaping Use    Vaping Use: Never used   Substance Use Topics    Alcohol use: No    Drug use: No     Medications  No current facility-administered medications on file prior to encounter.      Current Outpatient Medications on File Prior to Encounter   Medication Sig Dispense Refill    MULTIPLE VITAMIN PO Take by mouth      Cyanocobalamin (VITAMIN B 12) 500 MCG TABS Take by mouth      [DISCONTINUED] ondansetron (ZOFRAN) 4 MG tablet Take 1 tablet by mouth every 8 hours as needed for Nausea 12 tablet 0     Current Facility-Administered Medications   Medication Dose Route Frequency Provider Last Rate Last Admin    sodium chloride flush 0.9 % injection 5-40 mL  5-40 mL IntraVENous 2 times per day Ayah Ching MD        sodium chloride flush 0.9 % injection 5-40 mL  5-40 mL IntraVENous PRN Ayah Ching MD        0.9 % sodium chloride infusion   IntraVENous PRN Yanelis Mendez Francoise Prince  mL/hr at 23 0837 New Bag at 23 08     Vital Signs (Current)   Vitals:    2334   BP: 122/77   Pulse: 87   Resp: 16   Temp: 98.3 °F (36.8 °C)   SpO2: 94%     Vital Signs Statistics (for past 48 hrs)     Temp  Av.3 °F (36.8 °C)  Min: 98.3 °F (36.8 °C)   Min taken time: 2334  Max: 98.3 °F (36.8 °C)   Max taken time: 2334  Pulse  Av  Min: 80   Min taken time: 2334  Max: 80   Max taken time: 2334  Resp  Av  Min: 12   Min taken time: 2334  Max: 12   Max taken time: 23 0834  BP  Min: 122/77   Min taken time: 2334  Max: 122/77   Max taken time: 2334  SpO2  Av %  Min: 94 %   Min taken time: 23 0834  Max: 94 %   Max taken time: 23 0834    BP Readings from Last 3 Encounters:   23 122/77   22 131/84   22 122/80     BMI  Body mass index is 30.85 kg/m². Estimated body mass index is 30.85 kg/m² as calculated from the following:    Height as of this encounter: 5' 10\" (1.778 m). Weight as of this encounter: 215 lb (97.5 kg).     CBC   Lab Results   Component Value Date/Time    WBC 5.2 2022 12:01 PM    RBC 4.63 2022 12:01 PM    HGB 14.7 2022 12:01 PM    HCT 42.5 2022 12:01 PM    MCV 91.8 2022 12:01 PM    RDW 12.5 2022 12:01 PM     2022 12:01 PM     CMP    Lab Results   Component Value Date/Time     2022 12:01 PM    K 4.1 2022 12:01 PM    K 4.3 2018 05:53 AM     2022 12:01 PM    CO2 23 2022 12:01 PM    BUN 12 2022 12:01 PM    CREATININE 0.7 2022 12:01 PM    GFRAA >60 2022 12:01 PM    GFRAA >60 2011 11:31 AM    AGRATIO 1.6 2022 12:01 PM    LABGLOM >60 2022 12:01 PM    GLUCOSE 92 2022 12:01 PM    PROT 7.2 2022 12:01 PM    PROT 7.5 2011 11:31 AM    CALCIUM 10.1 2022 12:01 PM    BILITOT 0.3 2022 12:01 PM    ALKPHOS 89 2022 12:01 PM    AST 21 01/05/2022 12:01 PM    ALT 24 01/05/2022 12:01 PM     BMP    Lab Results   Component Value Date/Time     01/05/2022 12:01 PM    K 4.1 01/05/2022 12:01 PM    K 4.3 03/02/2018 05:53 AM     01/05/2022 12:01 PM    CO2 23 01/05/2022 12:01 PM    BUN 12 01/05/2022 12:01 PM    CREATININE 0.7 01/05/2022 12:01 PM    CALCIUM 10.1 01/05/2022 12:01 PM    GFRAA >60 01/05/2022 12:01 PM    GFRAA >60 01/08/2011 11:31 AM    LABGLOM >60 01/05/2022 12:01 PM    GLUCOSE 92 01/05/2022 12:01 PM     POCGlucose  No results for input(s): GLUCOSE in the last 72 hours. Coags  No results found for: PROTIME, INR, APTT  HCG (If Applicable) No results found for: PREGTESTUR, PREGSERUM, HCG, HCGQUANT   ABGs No results found for: PHART, PO2ART, BWY9FQE, YPV5MJE, BEART, U8KIUCDH   Type & Screen (If Applicable)  No results found for: LABABO, LABRH                         BMI: Wt Readings from Last 3 Encounters:       NPO Status:   Date of last liquid consumption: 03/02/23   Time of last liquid consumption: 0330   Date of last solid food consumption: 02/28/23      Time of last solid consumption: 2230       Anesthesia Evaluation  Patient summary reviewed no history of anesthetic complications:   Airway: Mallampati: III  TM distance: >3 FB   Neck ROM: full  Mouth opening: > = 3 FB   Dental: normal exam         Pulmonary:Negative Pulmonary ROS and normal exam                               Cardiovascular:  Exercise tolerance: good (>4 METS),   (+) valvular problems/murmurs: MVP,       ECG reviewed  Rhythm: regular  Rate: normal  Echocardiogram reviewed         Beta Blocker:  Not on Beta Blocker      ROS comment: EF 60     Neuro/Psych:   Negative Neuro/Psych ROS              GI/Hepatic/Renal: Neg GI/Hepatic/Renal ROS  (+) bowel prep,      (-) GERD       Endo/Other: Negative Endo/Other ROS       (-) diabetes mellitus, blood dyscrasia               Abdominal:             Vascular: negative vascular ROS.          Other Findings: Anesthesia Plan      MAC     ASA 2       Induction: intravenous. Anesthetic plan and risks discussed with patient. Plan discussed with CRNA. This pre-anesthesia assessment may be used as a history and physical.    DOS STAFF ADDENDUM:    Pt seen and examined, chart reviewed (including anesthesia, drug and allergy history). No interval changes to history and physical examination. Anesthetic plan, risks, benefits, alternatives, and personnel involved discussed with patient. Questions and concerns addressed. Patient(family) verbalized an understanding and agrees to proceed.       Anny Vallejo MD  March 2, 2023  8:44 AM

## 2023-03-02 NOTE — OP NOTE
Colonoscopy Procedure Note      Patient: Charbel Rawls  : 1960  Acct#:     Procedure: Colonoscopy    Date:  3/2/2023    Surgeon:  Tiburcio George MD    Referring Physician:  Guillermo Mao DO    Previous Colonoscopy: YES  Date:  13+ years ago  Greater than 3 years: YES    Preoperative Diagnosis:  1. Screening     Postoperative Diagnosis:  1. Mild Sigmoid Diverticulosis    Consent:  The patient or their legal guardian has signed a consent, and is aware of the potential risks, benefits, alternatives, and potential complications of this procedure. These include, but are not limited to hemorrhage, bleeding, post procedural pain, perforation, phlebitis, aspiration, hypotension, hypoxia, cardiovascular events such as arryhthmia, and possibly death. Additionally, the possibility of missed colonic polyps and interval colon cancer was discussed in the consent. Anesthesia:  The patient was administered IV propofol per anesthesiology team.  Please see their operative records for full details. Procedure: An informed consent was obtained from the patient after explanation of indications, benefits, possible risks and complications of the procedure. The patient was then taken to the endoscopy suite, placed in the left lateral decubitus position, and the above IV anesthesia was administered. A digital rectal examination was performed and revealed negative without mass, lesions or tenderness. The Olympus video colonoscope was placed in the patient's rectum under digital direction and advanced to the cecum. The cecum was identified by characteristic anatomy and ballottment. The preparation was excellent. The ileocecal valve was identified. The scope was then withdrawn back through the cecum, ascending, transverse, descending, sigmoid colon, and rectum. Careful circumferential examination of the mucosa in these areas demonstrated:     The sigmoid colon had evidence of mild diverticulosis. The scope was then withdrawn into the rectum and retroflexed. The retroflexed view of the anal verge and rectum demonstrates normal rectum. The scope was straightened, the colon was decompressed and the scope was withdrawn from the patient. The patient tolerated the procedure well and was taken to the PACU in good condition. Estimated Blood Loss (mL): none     Complications: None    Specimens: * No specimens in log *    Impression:  See post-procedure diagnoses. Recommendations:    Recommend repeat colonoscopy in 10 years for screening purposes.      NATALIIA Eldridge [address] and [address]  3/2/2023  [phone]

## 2023-03-02 NOTE — ANESTHESIA POSTPROCEDURE EVALUATION
[facility] Department of Anesthesiology  Post-Anesthesia Note       Name:  Kristal Pena                                  Age:  62 y.o.  MRN:  [id number]     Last Vitals & Oxygen Saturation: /80   Pulse 65   Temp 97.9 °F (36.6 °C) (Temporal)   Resp 16   Ht 5' 10\" (1.778 m)   Wt 215 lb (97.5 kg)   LMP 02/01/2009   SpO2 95%   BMI 30.85 kg/m²   Patient Vitals for the past 4 hrs:   BP Temp Temp src Pulse Resp SpO2 Height Weight   03/02/23 0930 111/80 -- -- 65 16 95 % -- --   03/02/23 0920 111/62 97.9 °F (36.6 °C) Temporal 80 16 97 % -- --   03/02/23 0834 122/77 98.3 °F (36.8 °C) Temporal 87 16 94 % 5' 10\" (1.778 m) 215 lb (97.5 kg)       Level of consciousness:  Awake, alert    Respiratory: Respirations easy, no distress. Stable.    Cardiovascular: Hemodynamically stable.    Hydration: Adequate.    PONV: Adequately managed.    Post-op pain: Adequately controlled.    Post-op assessment: Tolerated anesthetic well without complication.    Complications:  None.    Nathaniel Alves MD  March 2, 2023   10:05 AM

## 2023-07-10 ENCOUNTER — TELEPHONE (OUTPATIENT)
Dept: FAMILY MEDICINE CLINIC | Age: 63
End: 2023-07-10

## 2023-07-10 DIAGNOSIS — Z00.00 PREVENTATIVE HEALTH CARE: Primary | ICD-10-CM

## 2023-07-10 NOTE — TELEPHONE ENCOUNTER
----- Message from Joel Sainz sent at 7/10/2023  1:33 PM EDT -----  Subject: Referral Request    Reason for referral request? labs for yearly physical  Provider patient wants to be referred to(if known):     Provider Phone Number(if known): Additional Information for Provider? Please give the patient a call once   orders are placed.  She would like to have these done prior to her   physical.  ---------------------------------------------------------------------------  --------------  Anai Fletcher INFO    [phone]; OK to leave message on voicemail  ---------------------------------------------------------------------------  --------------

## 2023-07-14 ENCOUNTER — HOSPITAL ENCOUNTER (OUTPATIENT)
Dept: WOMENS IMAGING | Age: 63
Discharge: HOME | End: 2023-07-14
Payer: OTHER GOVERNMENT

## 2023-07-14 DIAGNOSIS — Z12.31 SCREENING MAMMOGRAM FOR BREAST CANCER: ICD-10-CM

## 2023-07-14 PROCEDURE — 77067 SCR MAMMO BI INCL CAD: CPT

## 2023-07-24 DIAGNOSIS — Z00.00 PREVENTATIVE HEALTH CARE: ICD-10-CM

## 2023-07-24 LAB
ALBUMIN SERPL-MCNC: 4.6 G/DL (ref 3.4–5)
ALBUMIN/GLOB SERPL: 1.9 {RATIO} (ref 1.1–2.2)
ALP SERPL-CCNC: 85 U/L (ref 40–129)
ALT SERPL-CCNC: 17 U/L (ref 10–40)
ANION GAP SERPL CALCULATED.3IONS-SCNC: 11 MMOL/L (ref 3–16)
AST SERPL-CCNC: 18 U/L (ref 15–37)
BASOPHILS # BLD: 0 K/UL (ref 0–0.2)
BASOPHILS NFR BLD: 0.7 %
BILIRUB SERPL-MCNC: 0.5 MG/DL (ref 0–1)
BUN SERPL-MCNC: 12 MG/DL (ref 7–20)
CALCIUM SERPL-MCNC: 10 MG/DL (ref 8.3–10.6)
CHLORIDE SERPL-SCNC: 108 MMOL/L (ref 99–110)
CHOLEST SERPL-MCNC: 189 MG/DL (ref 0–199)
CO2 SERPL-SCNC: 25 MMOL/L (ref 21–32)
CREAT SERPL-MCNC: 0.8 MG/DL (ref 0.6–1.2)
DEPRECATED RDW RBC AUTO: 12.7 % (ref 12.4–15.4)
EOSINOPHIL # BLD: 0.2 K/UL (ref 0–0.6)
EOSINOPHIL NFR BLD: 3.3 %
GFR SERPLBLD CREATININE-BSD FMLA CKD-EPI: >60 ML/MIN/{1.73_M2}
GLUCOSE SERPL-MCNC: 107 MG/DL (ref 70–99)
HCT VFR BLD AUTO: 42.4 % (ref 36–48)
HDLC SERPL-MCNC: 49 MG/DL (ref 40–60)
HGB BLD-MCNC: 14.5 G/DL (ref 12–16)
LDLC SERPL CALC-MCNC: 119 MG/DL
LYMPHOCYTES # BLD: 1.8 K/UL (ref 1–5.1)
LYMPHOCYTES NFR BLD: 32.3 %
MCH RBC QN AUTO: 31.8 PG (ref 26–34)
MCHC RBC AUTO-ENTMCNC: 34.2 G/DL (ref 31–36)
MCV RBC AUTO: 92.9 FL (ref 80–100)
MONOCYTES # BLD: 0.6 K/UL (ref 0–1.3)
MONOCYTES NFR BLD: 10.6 %
NEUTROPHILS # BLD: 2.9 K/UL (ref 1.7–7.7)
NEUTROPHILS NFR BLD: 53.1 %
PLATELET # BLD AUTO: 292 K/UL (ref 135–450)
PMV BLD AUTO: 8.6 FL (ref 5–10.5)
POTASSIUM SERPL-SCNC: 4.2 MMOL/L (ref 3.5–5.1)
PROT SERPL-MCNC: 7 G/DL (ref 6.4–8.2)
RBC # BLD AUTO: 4.57 M/UL (ref 4–5.2)
SODIUM SERPL-SCNC: 144 MMOL/L (ref 136–145)
TRIGL SERPL-MCNC: 107 MG/DL (ref 0–150)
VLDLC SERPL CALC-MCNC: 21 MG/DL
WBC # BLD AUTO: 5.4 K/UL (ref 4–11)

## 2023-07-24 PROCEDURE — 36415 COLL VENOUS BLD VENIPUNCTURE: CPT | Performed by: FAMILY MEDICINE

## 2023-08-07 ENCOUNTER — OFFICE VISIT (OUTPATIENT)
Dept: FAMILY MEDICINE CLINIC | Age: 63
End: 2023-08-07
Payer: OTHER GOVERNMENT

## 2023-08-07 VITALS
WEIGHT: 228 LBS | HEART RATE: 79 BPM | BODY MASS INDEX: 32.64 KG/M2 | HEIGHT: 70 IN | DIASTOLIC BLOOD PRESSURE: 77 MMHG | OXYGEN SATURATION: 97 % | SYSTOLIC BLOOD PRESSURE: 118 MMHG

## 2023-08-07 DIAGNOSIS — Z00.00 PREVENTATIVE HEALTH CARE: Primary | ICD-10-CM

## 2023-08-07 DIAGNOSIS — R40.0 DAYTIME SOMNOLENCE: ICD-10-CM

## 2023-08-07 DIAGNOSIS — R20.2 PARESTHESIAS: ICD-10-CM

## 2023-08-07 DIAGNOSIS — R06.83 SNORING: ICD-10-CM

## 2023-08-07 DIAGNOSIS — M62.50 MUSCULAR ATROPHY, UNSPECIFIED SITE: ICD-10-CM

## 2023-08-07 LAB
FOLATE SERPL-MCNC: 13.69 NG/ML (ref 4.78–24.2)
TSH SERPL DL<=0.005 MIU/L-ACNC: 0.71 UIU/ML (ref 0.27–4.2)
VIT B12 SERPL-MCNC: 887 PG/ML (ref 211–911)

## 2023-08-07 PROCEDURE — 99396 PREV VISIT EST AGE 40-64: CPT | Performed by: FAMILY MEDICINE

## 2023-08-07 PROCEDURE — 36415 COLL VENOUS BLD VENIPUNCTURE: CPT | Performed by: FAMILY MEDICINE

## 2023-08-07 SDOH — ECONOMIC STABILITY: FOOD INSECURITY: WITHIN THE PAST 12 MONTHS, THE FOOD YOU BOUGHT JUST DIDN'T LAST AND YOU DIDN'T HAVE MONEY TO GET MORE.: NEVER TRUE

## 2023-08-07 SDOH — ECONOMIC STABILITY: FOOD INSECURITY: WITHIN THE PAST 12 MONTHS, YOU WORRIED THAT YOUR FOOD WOULD RUN OUT BEFORE YOU GOT MONEY TO BUY MORE.: NEVER TRUE

## 2023-08-07 SDOH — ECONOMIC STABILITY: INCOME INSECURITY: HOW HARD IS IT FOR YOU TO PAY FOR THE VERY BASICS LIKE FOOD, HOUSING, MEDICAL CARE, AND HEATING?: NOT HARD AT ALL

## 2023-08-07 SDOH — ECONOMIC STABILITY: HOUSING INSECURITY
IN THE LAST 12 MONTHS, WAS THERE A TIME WHEN YOU DID NOT HAVE A STEADY PLACE TO SLEEP OR SLEPT IN A SHELTER (INCLUDING NOW)?: NO

## 2023-08-07 ASSESSMENT — PATIENT HEALTH QUESTIONNAIRE - PHQ9
SUM OF ALL RESPONSES TO PHQ9 QUESTIONS 1 & 2: 0
1. LITTLE INTEREST OR PLEASURE IN DOING THINGS: 0
SUM OF ALL RESPONSES TO PHQ QUESTIONS 1-9: 0
2. FEELING DOWN, DEPRESSED OR HOPELESS: 0
SUM OF ALL RESPONSES TO PHQ QUESTIONS 1-9: 0

## 2023-08-07 NOTE — PROGRESS NOTES
A/P:    Diagnosis Orders   1. Preventative health care        2. Paresthesias  Nerve conduction test    Vitamin B12 & Folate    TSH    Hemoglobin A1C      3. Muscular atrophy, unspecified site  Nerve conduction test      4. Daytime somnolence  [facility] Sleep Medicine      5. Snoring  [facility] Sleep Medicine          Healthy living encouraged, anticipatory guidance provided    For her continued paresthesias, above labs and nerve conduction study ordered    She does have prediabetes, the importance of lifestyle modifications emphasized    For her daytime somnolence and snoring, she would like to proceed with another sleep medicine referral    Follow-up in 6 months or sooner if needed    O:   Vitals:    08/07/23 1329   BP: 118/77   Pulse: 79   SpO2: 97%     Gen- NAD, pleasant  HEENT- Eyes without icterus or injection, throat and tms unremarkable  Neck- Supple, no lymphadenopathy appreciated  Lungs- CTAB  Heart- RRR  Abd- Soft, non tender  Ext- No edema, I do appreciate some muscle wasting of medial left ankle behind medial malleolus  Psych- Appropriate    S: CC-physical  HPI-Joanna presents for physical.  She reports that her left foot continues to burn medially. She gets twitching. She has noticed some muscle wasting behind medial malleolus. She reports she will be scheduling follow-up appointment with her gynecologist.  She is up-to-date with her mammogram.  She is up-to-date with her colon cancer screening. She continues to snore and felel tired during the day. ROS  Denies fever, chills, night sweats  Denies headaches, sore throat, ear pain  Denies chest pain, dyspnea, palpitations  Denies nausea, vomiting, diarrhea  Denies joint pain  Denies depression, anxiety  Denies urinary symptoms  Denies rashes    Current Outpatient Medications   Medication Sig Dispense Refill    MULTIPLE VITAMIN PO Take by mouth       No current facility-administered medications for this visit.         No Known Allergies     Past

## 2023-08-07 NOTE — PROGRESS NOTES
Left medial fot twitching, smaller post to [facility]--- burning --- years

## 2023-08-08 LAB
EST. AVERAGE GLUCOSE BLD GHB EST-MCNC: 105.4 MG/DL
HBA1C MFR BLD: 5.3 %

## 2023-08-17 ENCOUNTER — TELEPHONE (OUTPATIENT)
Dept: FAMILY MEDICINE CLINIC | Age: 63
End: 2023-08-17

## 2023-08-17 DIAGNOSIS — R20.2 PARESTHESIA OF LEFT FOOT: Primary | ICD-10-CM

## 2023-08-17 DIAGNOSIS — M62.572 ATROPHY OF MUSCLE OF LEFT FOOT: ICD-10-CM

## 2023-08-17 NOTE — TELEPHONE ENCOUNTER
Pt called stating she has never heard anything from anyone in regards to the nerve conduction test. Pt was told to call back if no one reached out to her.  Pt is reachable at [phone]

## 2023-08-17 NOTE — TELEPHONE ENCOUNTER
Called scheduling and scheduling informed me of places pt can get nerve conduction studies scheduled. Informed pt and sent it through [employer] per pt.

## 2023-08-17 NOTE — TELEPHONE ENCOUNTER
Pt called and states the doctor she was trying to call for the nerve conduction testing and there was something's needed for the referral. I called office and Finesse Hickman from the [place] location states the referral needs to be more specific on the body part, then pt can schedule. Please advise.

## 2023-08-23 ENCOUNTER — PROCEDURE VISIT (OUTPATIENT)
Dept: NEUROLOGY | Age: 63
End: 2023-08-23
Payer: OTHER GOVERNMENT

## 2023-08-23 DIAGNOSIS — M62.572 ATROPHY OF MUSCLE OF LEFT FOOT: Primary | ICD-10-CM

## 2023-08-23 PROCEDURE — 95908 NRV CNDJ TST 3-4 STUDIES: CPT | Performed by: PSYCHIATRY & NEUROLOGY

## 2023-08-23 PROCEDURE — 95886 MUSC TEST DONE W/N TEST COMP: CPT | Performed by: PSYCHIATRY & NEUROLOGY

## 2023-08-23 NOTE — PROGRESS NOTES
Wiley Rae M.D. [facility]  Board Certified in [address]    EMG / NERVE CONDUCTION STUDY      PATIENT:  Meggan Gibbs       DATE OF EM23     YOB: 1960       REASON FOR EMG:   Muscle atrophy in the left leg/foot      REFERRING PHYSICIAN:  Willian Cordon, DO  [address]  [place],  [address] [facility]     SUMMARY:   The left peroneal motor nerve study was normal  The left posterior tibial motor nerve study was normal  The left sural sensory nerve study was normal  Needle EMG of several muscles in the left lower extremity was normal      CLINICAL DIAGNOSIS:  Neuropathy        EMG RESULTS:     This is a normal EMG nerve conduction study of the left lower extremity. There is no electrophysiological evidence for neuropathy in this study. ---------------------------------------------  Wiley Rae M.D.   Electromyographer / Neurologist

## 2023-08-28 ENCOUNTER — OFFICE VISIT (OUTPATIENT)
Dept: SLEEP MEDICINE | Age: 63
End: 2023-08-28
Payer: OTHER GOVERNMENT

## 2023-08-28 VITALS
DIASTOLIC BLOOD PRESSURE: 70 MMHG | OXYGEN SATURATION: 98 % | TEMPERATURE: 97.6 F | HEART RATE: 95 BPM | SYSTOLIC BLOOD PRESSURE: 120 MMHG | WEIGHT: 228.2 LBS | RESPIRATION RATE: 18 BRPM | BODY MASS INDEX: 32.67 KG/M2 | HEIGHT: 70 IN

## 2023-08-28 DIAGNOSIS — R06.83 SNORING: ICD-10-CM

## 2023-08-28 DIAGNOSIS — E66.9 OBESITY (BMI 30.0-34.9): ICD-10-CM

## 2023-08-28 DIAGNOSIS — G47.19 EXCESSIVE DAYTIME SLEEPINESS: ICD-10-CM

## 2023-08-28 DIAGNOSIS — G47.33 OSA (OBSTRUCTIVE SLEEP APNEA): Primary | ICD-10-CM

## 2023-08-28 PROCEDURE — 99204 OFFICE O/P NEW MOD 45 MIN: CPT | Performed by: PSYCHIATRY & NEUROLOGY

## 2023-08-28 ASSESSMENT — SLEEP AND FATIGUE QUESTIONNAIRES
HOW LIKELY ARE YOU TO NOD OFF OR FALL ASLEEP WHILE SITTING AND READING: 2
HOW LIKELY ARE YOU TO NOD OFF OR FALL ASLEEP WHEN YOU ARE A PASSENGER IN A CAR FOR AN HOUR WITHOUT A BREAK: 0
HOW LIKELY ARE YOU TO NOD OFF OR FALL ASLEEP IN A CAR, WHILE STOPPED FOR A FEW MINUTES IN TRAFFIC: 0
HOW LIKELY ARE YOU TO NOD OFF OR FALL ASLEEP WHILE WATCHING TV: 2
NECK CIRCUMFERENCE (INCHES): 15
HOW LIKELY ARE YOU TO NOD OFF OR FALL ASLEEP WHILE SITTING INACTIVE IN A PUBLIC PLACE: 0
ESS TOTAL SCORE: 7
HOW LIKELY ARE YOU TO NOD OFF OR FALL ASLEEP WHILE SITTING AND TALKING TO SOMEONE: 0
HOW LIKELY ARE YOU TO NOD OFF OR FALL ASLEEP WHILE SITTING QUIETLY AFTER LUNCH WITHOUT ALCOHOL: 1
HOW LIKELY ARE YOU TO NOD OFF OR FALL ASLEEP WHILE LYING DOWN TO REST IN THE AFTERNOON WHEN CIRCUMSTANCES PERMIT: 2

## 2023-08-28 ASSESSMENT — ENCOUNTER SYMPTOMS
EYES NEGATIVE: 1
CHOKING: 1
GASTROINTESTINAL NEGATIVE: 1
ALLERGIC/IMMUNOLOGIC NEGATIVE: 1

## 2023-08-28 NOTE — PROGRESS NOTES
considerations include the use of nasal CPAP, inspire, oral dental appliance or a surgical intervention, which should be based on otolarygologic findings, In the meantime, the patient should be cautioned to avoid the use of alcohol or other depressant medications because of potential for increasing the duration and severity of apnea and cautioned regarding driving or operating and dangerous equipment if the patient is experiencing daytime sleepiness. .    The proportionality between weight and AHI. With 10% weight change, the AHI has a 27% proportionate change. With 20% weight change, the AHI has a 45-50% proportionate change. Orders Placed This Encounter   Procedures    Home Sleep Study    Sleep Study with PAP Titration       Return for F/U between 31 and 90 days from the recieving CPAP.     Salomon Suazo MD  Medical Director - [facility]

## 2023-08-28 NOTE — PATIENT INSTRUCTIONS
Orders Placed This Encounter   Procedures    Home Sleep Study     Standing Status:   Future     Standing Expiration Date:   8/28/2024     Order Specific Question:   Location For Sleep Study     Answer:   [place]     Order Specific Question:   Select Sleep Lab Location     Answer:   [facility]    Sleep Study with PAP Titration     Standing Status:   Future     Standing Expiration Date:   8/28/2024     Order Specific Question:   Sleep Study Titration Type     Answer:   CPAP     Order Specific Question:   Location For Sleep Study     Answer:   [place]     Order Specific Question:   Select Sleep Lab Location     Answer:   [facility]

## 2023-09-06 ENCOUNTER — HOSPITAL ENCOUNTER (OUTPATIENT)
Dept: SLEEP CENTER | Age: 63
Discharge: HOME | End: 2023-09-06
Attending: PSYCHIATRY & NEUROLOGY
Payer: OTHER GOVERNMENT

## 2023-09-06 DIAGNOSIS — G47.33 OSA (OBSTRUCTIVE SLEEP APNEA): ICD-10-CM

## 2023-09-06 PROCEDURE — 95806 SLEEP STUDY UNATT&RESP EFFT: CPT

## 2023-09-07 PROBLEM — G47.33 OSA (OBSTRUCTIVE SLEEP APNEA): Status: ACTIVE | Noted: 2023-09-07

## 2023-09-07 PROBLEM — R09.02 HYPOXEMIA: Status: ACTIVE | Noted: 2023-09-07

## 2023-09-08 ENCOUNTER — TELEPHONE (OUTPATIENT)
Dept: PULMONOLOGY | Age: 63
End: 2023-09-08

## 2023-09-08 NOTE — TELEPHONE ENCOUNTER
Sleep study showed mild MICHELLE. AHI was 10.2  per hr. And O2 Desaturations to 82%. Dr Jose Luis Johnson titration.     Pt notified of message  Transferred to the sleep lab

## 2023-10-05 ENCOUNTER — HOSPITAL ENCOUNTER (OUTPATIENT)
Dept: SLEEP CENTER | Age: 63
Discharge: HOME | End: 2023-10-05
Payer: OTHER GOVERNMENT

## 2023-10-05 DIAGNOSIS — G47.33 OSA (OBSTRUCTIVE SLEEP APNEA): ICD-10-CM

## 2023-10-05 PROCEDURE — 95811 POLYSOM 6/>YRS CPAP 4/> PARM: CPT

## 2023-10-10 ENCOUNTER — TELEPHONE (OUTPATIENT)
Dept: PULMONOLOGY | Age: 63
End: 2023-10-10

## 2023-10-10 NOTE — TELEPHONE ENCOUNTER
Titration study shows adequate control of the events at a CPAP pressure of 9 cm.       DME: going to check with Rolo Course and call back     Patient will need appointment 31-90 days after starting new machine

## 2023-12-13 ENCOUNTER — OFFICE VISIT (OUTPATIENT)
Dept: FAMILY MEDICINE CLINIC | Age: 63
End: 2023-12-13
Payer: OTHER GOVERNMENT

## 2023-12-13 VITALS
SYSTOLIC BLOOD PRESSURE: 110 MMHG | HEART RATE: 88 BPM | DIASTOLIC BLOOD PRESSURE: 78 MMHG | OXYGEN SATURATION: 97 % | HEIGHT: 70 IN | BODY MASS INDEX: 32.33 KG/M2 | WEIGHT: 225.8 LBS

## 2023-12-13 DIAGNOSIS — N30.00 ACUTE CYSTITIS WITHOUT HEMATURIA: ICD-10-CM

## 2023-12-13 DIAGNOSIS — R30.0 DYSURIA: Primary | ICD-10-CM

## 2023-12-13 LAB
BILIRUBIN, POC: NEGATIVE
BLOOD URINE, POC: NEGATIVE
CLARITY, POC: NORMAL
COLOR, POC: NORMAL
GLUCOSE URINE, POC: NEGATIVE
KETONES, POC: NEGATIVE
LEUKOCYTE EST, POC: NORMAL
NITRITE, POC: NEGATIVE
PH, POC: 6
PROTEIN, POC: NEGATIVE
SPECIFIC GRAVITY, POC: 1.02
UROBILINOGEN, POC: 0.2

## 2023-12-13 PROCEDURE — 81002 URINALYSIS NONAUTO W/O SCOPE: CPT | Performed by: NURSE PRACTITIONER

## 2023-12-13 PROCEDURE — 99213 OFFICE O/P EST LOW 20 MIN: CPT | Performed by: NURSE PRACTITIONER

## 2023-12-13 RX ORDER — NITROFURANTOIN 25; 75 MG/1; MG/1
100 CAPSULE ORAL 2 TIMES DAILY
Qty: 14 CAPSULE | Refills: 0 | Status: SHIPPED | OUTPATIENT
Start: 2023-12-13 | End: 2023-12-20

## 2023-12-13 ASSESSMENT — PATIENT HEALTH QUESTIONNAIRE - PHQ9
SUM OF ALL RESPONSES TO PHQ QUESTIONS 1-9: 0
SUM OF ALL RESPONSES TO PHQ9 QUESTIONS 1 & 2: 0
1. LITTLE INTEREST OR PLEASURE IN DOING THINGS: 0
2. FEELING DOWN, DEPRESSED OR HOPELESS: 0
SUM OF ALL RESPONSES TO PHQ QUESTIONS 1-9: 0

## 2023-12-13 NOTE — PROGRESS NOTES
Terrie Mathur   YOB: 1960    Date of Visit:  2023      CC: Burning when urinating    HPI:  Patient complains of a 6 day(s) history of dysuria and frequency. She denies any other symptoms. Symptoms have been worsening with time. Sexually active: No.  Relevant medical history: none. Treatment to date: cranberry juice.     Vitals:    23 1534   BP: 110/78   Pulse: 88   SpO2: 97%   Weight: 102.4 kg (225 lb 12.8 oz)   Height: 1.778 m (5' 10\")       Outpatient Medications Marked as Taking for the 23 encounter (Office Visit) with TRENTON Simpson CNP   Medication Sig Dispense Refill    nitrofurantoin, macrocrystal-monohydrate, (MACROBID) 100 MG capsule Take 1 capsule by mouth 2 times daily for 7 days 14 capsule 0    MULTIPLE VITAMIN PO Take by mouth         Past Medical History:   Diagnosis Date    Benign positional vertigo     Constipation     Irritable bowel syndrome     Prolapse of female pelvic organs     Shortness of breath     Twitching        Past Surgical History:   Procedure Laterality Date    ABDOMEN SURGERY      pelvic organ prolapse    COLONOSCOPY  2023    Dr. Cat Lopez    COLONOSCOPY N/A 3/2/2023    COLORECTAL CANCER SCREENING, NOT HIGH RISK performed by Alexander Marinelli MD at [address] [place] [address] History     Tobacco Use    Smoking status: Former     Packs/day: 1.00     Years: 15.00     Additional pack years: 0.00     Total pack years: 15.00     Types: Cigarettes     Quit date: 1994     Years since quittin.9     Passive exposure: Past    Smokeless tobacco: Never   Substance Use Topics    Alcohol use: No       Family History   Problem Relation Age of Onset    Diabetes Mother         neuropathy    Diabetes Father     High Cholesterol Father     Coronary Art Dis Father         62s    Diabetes Sister     Diabetes type 2  Brother              ROS:  As documented in HPI    PHYSICAL EXAM:  Vitals:    23 1534   BP: 110/78   Pulse:

## 2023-12-15 LAB
BACTERIA UR CULT: ABNORMAL
ORGANISM: ABNORMAL

## 2024-09-13 ENCOUNTER — TELEPHONE (OUTPATIENT)
Dept: FAMILY MEDICINE CLINIC | Age: 64
End: 2024-09-13

## 2024-09-16 ENCOUNTER — HOSPITAL ENCOUNTER (OUTPATIENT)
Dept: CT IMAGING | Age: 64
Discharge: HOME | End: 2024-09-16
Payer: OTHER GOVERNMENT

## 2024-09-16 ENCOUNTER — OFFICE VISIT (OUTPATIENT)
Dept: FAMILY MEDICINE CLINIC | Age: 64
End: 2024-09-16
Payer: OTHER GOVERNMENT

## 2024-09-16 VITALS
WEIGHT: 222 LBS | OXYGEN SATURATION: 95 % | TEMPERATURE: 98.2 F | DIASTOLIC BLOOD PRESSURE: 74 MMHG | HEIGHT: 70 IN | HEART RATE: 69 BPM | SYSTOLIC BLOOD PRESSURE: 128 MMHG | BODY MASS INDEX: 31.78 KG/M2

## 2024-09-16 DIAGNOSIS — R10.84 GENERALIZED ABDOMINAL PAIN: ICD-10-CM

## 2024-09-16 DIAGNOSIS — R20.0 LEFT ARM NUMBNESS: Primary | ICD-10-CM

## 2024-09-16 DIAGNOSIS — K59.00 CONSTIPATION, UNSPECIFIED CONSTIPATION TYPE: ICD-10-CM

## 2024-09-16 LAB
CREAT SERPL-MCNC: 0.7 MG/DL (ref 0.6–1.2)
GFR SERPLBLD CREATININE-BSD FMLA CKD-EPI: >90 ML/MIN/{1.73_M2}

## 2024-09-16 PROCEDURE — 74177 CT ABD & PELVIS W/CONTRAST: CPT

## 2024-09-16 PROCEDURE — 99214 OFFICE O/P EST MOD 30 MIN: CPT | Performed by: NURSE PRACTITIONER

## 2024-09-16 PROCEDURE — 36415 COLL VENOUS BLD VENIPUNCTURE: CPT

## 2024-09-16 PROCEDURE — 93000 ELECTROCARDIOGRAM COMPLETE: CPT | Performed by: NURSE PRACTITIONER

## 2024-09-16 PROCEDURE — 6360000004 HC RX CONTRAST MEDICATION: Performed by: INTERNAL MEDICINE

## 2024-09-16 PROCEDURE — 82565 ASSAY OF CREATININE: CPT

## 2024-09-16 RX ORDER — LACTULOSE 10 G/15ML
10 SOLUTION ORAL EVERY EVENING
Qty: 30 ML | Refills: 0 | Status: SHIPPED | OUTPATIENT
Start: 2024-09-16

## 2024-09-16 RX ADMIN — IOMEPROL INJECTION 100 ML: 714 INJECTION, SOLUTION INTRAVASCULAR at 13:53

## 2024-09-16 SDOH — ECONOMIC STABILITY: FOOD INSECURITY: WITHIN THE PAST 12 MONTHS, THE FOOD YOU BOUGHT JUST DIDN'T LAST AND YOU DIDN'T HAVE MONEY TO GET MORE.: NEVER TRUE

## 2024-09-16 SDOH — ECONOMIC STABILITY: FOOD INSECURITY: WITHIN THE PAST 12 MONTHS, YOU WORRIED THAT YOUR FOOD WOULD RUN OUT BEFORE YOU GOT MONEY TO BUY MORE.: NEVER TRUE

## 2024-09-16 SDOH — ECONOMIC STABILITY: INCOME INSECURITY: HOW HARD IS IT FOR YOU TO PAY FOR THE VERY BASICS LIKE FOOD, HOUSING, MEDICAL CARE, AND HEATING?: NOT HARD AT ALL

## 2024-09-16 ASSESSMENT — PATIENT HEALTH QUESTIONNAIRE - PHQ9
SUM OF ALL RESPONSES TO PHQ QUESTIONS 1-9: 0
2. FEELING DOWN, DEPRESSED OR HOPELESS: NOT AT ALL
1. LITTLE INTEREST OR PLEASURE IN DOING THINGS: NOT AT ALL
SUM OF ALL RESPONSES TO PHQ9 QUESTIONS 1 & 2: 0

## 2024-11-06 ENCOUNTER — HOSPITAL ENCOUNTER (OUTPATIENT)
Dept: WOMENS IMAGING | Age: 64
Discharge: HOME | End: 2024-11-06
Payer: OTHER GOVERNMENT

## 2024-11-06 VITALS — WEIGHT: 220 LBS | BODY MASS INDEX: 31.5 KG/M2 | HEIGHT: 70 IN

## 2024-11-06 DIAGNOSIS — Z12.31 SCREENING MAMMOGRAM FOR BREAST CANCER: ICD-10-CM

## 2024-11-06 PROCEDURE — 77063 BREAST TOMOSYNTHESIS BI: CPT

## 2024-12-26 ENCOUNTER — APPOINTMENT (OUTPATIENT)
Dept: GENERAL RADIOLOGY | Age: 64
End: 2024-12-26
Payer: OTHER GOVERNMENT

## 2024-12-26 ENCOUNTER — HOSPITAL ENCOUNTER (EMERGENCY)
Age: 64
Discharge: HOME | End: 2024-12-27
Attending: EMERGENCY MEDICINE
Payer: OTHER GOVERNMENT

## 2024-12-26 DIAGNOSIS — R07.9 CHEST PAIN, UNSPECIFIED TYPE: ICD-10-CM

## 2024-12-26 DIAGNOSIS — M54.6 ACUTE LEFT-SIDED THORACIC BACK PAIN: Primary | ICD-10-CM

## 2024-12-26 LAB
ALBUMIN SERPL-MCNC: 4.4 G/DL (ref 3.4–5)
ALBUMIN/GLOB SERPL: 1.6 {RATIO} (ref 1.1–2.2)
ALP SERPL-CCNC: 74 U/L (ref 40–129)
ALT SERPL-CCNC: 19 U/L (ref 10–40)
ANION GAP SERPL CALCULATED.3IONS-SCNC: 9 MMOL/L (ref 3–16)
AST SERPL-CCNC: 18 U/L (ref 15–37)
BASOPHILS # BLD: 0 K/UL (ref 0–0.2)
BASOPHILS NFR BLD: 0.6 %
BILIRUB SERPL-MCNC: <0.2 MG/DL (ref 0–1)
BUN SERPL-MCNC: 16 MG/DL (ref 7–20)
CALCIUM SERPL-MCNC: 9.6 MG/DL (ref 8.3–10.6)
CHLORIDE SERPL-SCNC: 109 MMOL/L (ref 99–110)
CO2 SERPL-SCNC: 27 MMOL/L (ref 21–32)
CREAT SERPL-MCNC: 1 MG/DL (ref 0.6–1.2)
D-DIMER QUANTITATIVE: <0.27 UG/ML FEU (ref 0–0.6)
DEPRECATED RDW RBC AUTO: 11.7 % (ref 12.4–15.4)
EOSINOPHIL # BLD: 0.2 K/UL (ref 0–0.6)
EOSINOPHIL NFR BLD: 2.7 %
GFR SERPLBLD CREATININE-BSD FMLA CKD-EPI: 63 ML/MIN/{1.73_M2}
GLUCOSE SERPL-MCNC: 105 MG/DL (ref 70–99)
HCT VFR BLD AUTO: 42.4 % (ref 36–48)
HGB BLD-MCNC: 14 G/DL (ref 12–16)
IMM GRANULOCYTES # BLD: 0 K/UL (ref 0–0.2)
IMM GRANULOCYTES NFR BLD: 0.1 %
LYMPHOCYTES # BLD: 2 K/UL (ref 1–5.1)
LYMPHOCYTES NFR BLD: 29.1 %
MCH RBC QN AUTO: 31.6 PG (ref 26–34)
MCHC RBC AUTO-ENTMCNC: 33 G/DL (ref 32–36.4)
MCV RBC AUTO: 95.7 FL (ref 80–100)
MONOCYTES # BLD: 0.8 K/UL (ref 0–1.3)
MONOCYTES NFR BLD: 11.7 %
NEUTROPHILS # BLD: 3.8 K/UL (ref 1.7–7.7)
NEUTROPHILS NFR BLD: 55.8 %
PLATELET # BLD AUTO: 265 K/UL (ref 135–450)
PMV BLD AUTO: 9.2 FL (ref 5–10.5)
POTASSIUM SERPL-SCNC: 4.6 MMOL/L (ref 3.5–5.1)
PROT SERPL-MCNC: 7.1 G/DL (ref 6.4–8.2)
RBC # BLD AUTO: 4.43 M/UL (ref 4–5.2)
SODIUM SERPL-SCNC: 145 MMOL/L (ref 136–145)
TROPONIN, HIGH SENSITIVITY: <6 NG/L (ref 0–14)
WBC # BLD AUTO: 6.8 K/UL (ref 4–11)

## 2024-12-26 PROCEDURE — 6360000002 HC RX W HCPCS: Performed by: EMERGENCY MEDICINE

## 2024-12-26 PROCEDURE — 93005 ELECTROCARDIOGRAM TRACING: CPT | Performed by: EMERGENCY MEDICINE

## 2024-12-26 PROCEDURE — 80053 COMPREHEN METABOLIC PANEL: CPT

## 2024-12-26 PROCEDURE — 71046 X-RAY EXAM CHEST 2 VIEWS: CPT

## 2024-12-26 PROCEDURE — 99285 EMERGENCY DEPT VISIT HI MDM: CPT

## 2024-12-26 PROCEDURE — 84484 ASSAY OF TROPONIN QUANT: CPT

## 2024-12-26 PROCEDURE — 85379 FIBRIN DEGRADATION QUANT: CPT

## 2024-12-26 PROCEDURE — 85025 COMPLETE CBC W/AUTO DIFF WBC: CPT

## 2024-12-26 PROCEDURE — 36415 COLL VENOUS BLD VENIPUNCTURE: CPT

## 2024-12-26 RX ORDER — KETOROLAC TROMETHAMINE 30 MG/ML
30 INJECTION, SOLUTION INTRAMUSCULAR; INTRAVENOUS ONCE
Status: COMPLETED | OUTPATIENT
Start: 2024-12-26 | End: 2024-12-26

## 2024-12-26 RX ADMIN — KETOROLAC TROMETHAMINE 30 MG: 30 INJECTION, SOLUTION INTRAMUSCULAR at 22:48

## 2024-12-26 ASSESSMENT — PAIN - FUNCTIONAL ASSESSMENT
PAIN_FUNCTIONAL_ASSESSMENT: 0-10
PAIN_FUNCTIONAL_ASSESSMENT: 0-10

## 2024-12-26 ASSESSMENT — PAIN DESCRIPTION - LOCATION
LOCATION: RIB CAGE
LOCATION: RIB CAGE

## 2024-12-26 ASSESSMENT — PAIN DESCRIPTION - ORIENTATION
ORIENTATION: LEFT
ORIENTATION: LEFT

## 2024-12-26 ASSESSMENT — PAIN SCALES - GENERAL
PAINLEVEL_OUTOF10: 3
PAINLEVEL_OUTOF10: 4
PAINLEVEL_OUTOF10: 4

## 2024-12-26 ASSESSMENT — PAIN DESCRIPTION - FREQUENCY: FREQUENCY: CONTINUOUS

## 2024-12-26 ASSESSMENT — PAIN DESCRIPTION - PAIN TYPE: TYPE: ACUTE PAIN

## 2024-12-27 VITALS
HEIGHT: 70 IN | BODY MASS INDEX: 30.78 KG/M2 | TEMPERATURE: 98.5 F | OXYGEN SATURATION: 97 % | HEART RATE: 65 BPM | RESPIRATION RATE: 16 BRPM | SYSTOLIC BLOOD PRESSURE: 124 MMHG | WEIGHT: 215 LBS | DIASTOLIC BLOOD PRESSURE: 72 MMHG

## 2024-12-27 LAB
EKG ATRIAL RATE: 64 BPM
EKG DIAGNOSIS: NORMAL
EKG P AXIS: 33 DEGREES
EKG P-R INTERVAL: 160 MS
EKG Q-T INTERVAL: 398 MS
EKG QRS DURATION: 96 MS
EKG QTC CALCULATION (BAZETT): 410 MS
EKG R AXIS: -14 DEGREES
EKG T AXIS: 2 DEGREES
EKG VENTRICULAR RATE: 64 BPM
TROPONIN, HIGH SENSITIVITY: 9 NG/L (ref 0–14)

## 2024-12-27 PROCEDURE — 84484 ASSAY OF TROPONIN QUANT: CPT

## 2024-12-27 PROCEDURE — 36415 COLL VENOUS BLD VENIPUNCTURE: CPT

## 2024-12-27 PROCEDURE — 93010 ELECTROCARDIOGRAM REPORT: CPT | Performed by: INTERNAL MEDICINE

## 2024-12-27 RX ORDER — METHOCARBAMOL 750 MG/1
750 TABLET, FILM COATED ORAL 3 TIMES DAILY PRN
Qty: 15 TABLET | Refills: 0 | Status: SHIPPED | OUTPATIENT
Start: 2024-12-27 | End: 2025-01-01

## 2024-12-27 RX ORDER — NAPROXEN 500 MG/1
500 TABLET ORAL 2 TIMES DAILY WITH MEALS
Qty: 20 TABLET | Refills: 0 | Status: SHIPPED | OUTPATIENT
Start: 2024-12-27

## 2024-12-27 ASSESSMENT — HEART SCORE: ECG: NON-SPECIFC REPOLARIZATION DISTURBANCE/LBTB/PM

## 2024-12-27 ASSESSMENT — PAIN - FUNCTIONAL ASSESSMENT
PAIN_FUNCTIONAL_ASSESSMENT: NONE - DENIES PAIN
PAIN_FUNCTIONAL_ASSESSMENT: 0-10

## 2024-12-27 ASSESSMENT — PAIN SCALES - GENERAL: PAINLEVEL_OUTOF10: 2

## 2024-12-27 ASSESSMENT — PAIN DESCRIPTION - PAIN TYPE: TYPE: ACUTE PAIN

## 2024-12-27 NOTE — ED PROVIDER NOTES
[facility]  EMERGENCY DEPT VISIT      Patient Identification  Kristal Pena is a 64 y.o. female.    Chief Complaint   RIB CAGE SIDE PAIN (Started with left rib cage side pan about 30 minutes ago. Denies injury, recent cold, cough)      History of Present Illness:    History was obtained from patient.  Limitations to history: none  This is a  64 y.o. female who presents ambulatory  to the ED with complaints of abrupt onset of left thoracic pain radiating around to under her breast. Onset 30 minutes ago while ladling out soup. Pain took her breath away and hurt more to breath. Mild shortness of breath. Slight increase in pain with movement. No fever. No URI symptoms. No flank pain. No abdominal pain. Pain is now starting to dissipate. She did not take anything for pain.  No recent travel or immobilization.  No recent surgeries.  No leg pain or swelling.  She is not on any hormone replacement therapy.  She is not sure if her father may have had a clot in his leg but unsure of the type. No known heart disease..     Past Medical History:   Diagnosis Date    Benign positional vertigo     Constipation     Irritable bowel syndrome     Prolapse of female pelvic organs     Shortness of breath     Twitching        Past Surgical History:   Procedure Laterality Date    ABDOMEN SURGERY      pelvic organ prolapse    COLONOSCOPY  03/2023    Dr. Sinan Snow    COLONOSCOPY N/A 3/2/2023    COLORECTAL CANCER SCREENING, NOT HIGH RISK performed by Sinan Snow MD at [facility] ENDOSCOPY       No current facility-administered medications for this encounter.    Current Outpatient Medications:     naproxen (NAPROSYN) 500 MG tablet, Take 1 tablet by mouth 2 times daily (with meals), Disp: 20 tablet, Rfl: 0    methocarbamol (ROBAXIN-750) 750 MG tablet, Take 1 tablet by mouth 3 times daily as needed (muscle spasms), Disp: 15 tablet, Rfl: 0    lactulose (CHRONULAC) 10 GM/15ML solution, Take 15 mLs by mouth every evening, Disp: 30 mL,

## 2024-12-27 NOTE — ED TRIAGE NOTES
Pt ambulatory to ED with complaint of left sided rib cage pain that started 30 minutes prior to arrival. Pt states no recent illness, fever. Pt states she was opening tracey jars when she developed this pain. Pt awake, alert. Resp appear unlabored. Pt states pain is worse with inspiration. No redness, edema,ecchymosis noted to left rib area.

## 2024-12-27 NOTE — DISCHARGE INSTRUCTIONS
Return for increased chest or back pain, trouble breathing, dizziness or fainting or other worsening symptoms

## 2024-12-30 ENCOUNTER — TELEPHONE (OUTPATIENT)
Dept: CARDIOLOGY CLINIC | Age: 64
End: 2024-12-30

## 2024-12-30 NOTE — TELEPHONE ENCOUNTER
Patient was seen in ED and was discharged with antiinflammatory for what most likely was a rib cage discomfort from musculoskeletal issue.   Spoke to patient. She feels  well now and just walked a mile without issue. She will call for an appt if discomfort comes back or PCP feels it is necessary. FERNANDA has been following her prn.

## 2025-03-11 SDOH — HEALTH STABILITY: PHYSICAL HEALTH: ON AVERAGE, HOW MANY DAYS PER WEEK DO YOU ENGAGE IN MODERATE TO STRENUOUS EXERCISE (LIKE A BRISK WALK)?: 1 DAY

## 2025-03-11 SDOH — HEALTH STABILITY: PHYSICAL HEALTH: ON AVERAGE, HOW MANY MINUTES DO YOU ENGAGE IN EXERCISE AT THIS LEVEL?: 30 MIN

## 2025-03-12 ENCOUNTER — OFFICE VISIT (OUTPATIENT)
Dept: FAMILY MEDICINE CLINIC | Age: 65
End: 2025-03-12

## 2025-03-12 VITALS
DIASTOLIC BLOOD PRESSURE: 86 MMHG | BODY MASS INDEX: 31.21 KG/M2 | SYSTOLIC BLOOD PRESSURE: 136 MMHG | HEART RATE: 92 BPM | OXYGEN SATURATION: 96 % | HEIGHT: 70 IN | WEIGHT: 218 LBS

## 2025-03-12 DIAGNOSIS — K59.00 CONSTIPATION, UNSPECIFIED CONSTIPATION TYPE: ICD-10-CM

## 2025-03-12 DIAGNOSIS — G47.33 OSA (OBSTRUCTIVE SLEEP APNEA): ICD-10-CM

## 2025-03-12 DIAGNOSIS — R10.821 RIGHT UPPER QUADRANT ABDOMINAL TENDERNESS WITH REBOUND TENDERNESS: ICD-10-CM

## 2025-03-12 DIAGNOSIS — Z00.00 ENCOUNTER FOR ADULT WELLNESS VISIT: Primary | ICD-10-CM

## 2025-03-12 PROBLEM — R09.02 HYPOXEMIA: Status: RESOLVED | Noted: 2023-09-07 | Resolved: 2025-03-12

## 2025-03-12 SDOH — ECONOMIC STABILITY: FOOD INSECURITY: WITHIN THE PAST 12 MONTHS, THE FOOD YOU BOUGHT JUST DIDN'T LAST AND YOU DIDN'T HAVE MONEY TO GET MORE.: NEVER TRUE

## 2025-03-12 SDOH — ECONOMIC STABILITY: FOOD INSECURITY: WITHIN THE PAST 12 MONTHS, YOU WORRIED THAT YOUR FOOD WOULD RUN OUT BEFORE YOU GOT MONEY TO BUY MORE.: NEVER TRUE

## 2025-03-12 ASSESSMENT — PATIENT HEALTH QUESTIONNAIRE - PHQ9
1. LITTLE INTEREST OR PLEASURE IN DOING THINGS: NOT AT ALL
SUM OF ALL RESPONSES TO PHQ QUESTIONS 1-9: 0
2. FEELING DOWN, DEPRESSED OR HOPELESS: NOT AT ALL

## 2025-03-12 NOTE — ASSESSMENT & PLAN NOTE
Given handout on bowel clean out, see pt instructions  Advised to call if escalates  Instructed on daily stool softener after resolution

## 2025-03-12 NOTE — ASSESSMENT & PLAN NOTE
Not able to tolerate face mask, had oral appliance made by dentist and reports going well with this.

## 2025-03-12 NOTE — PATIENT INSTRUCTIONS
To schedule your Ultrasound please call our scheduling number at [phone]      For your constipation,    Start with making sure you are staying hydrated and eating fiber, additional information below on this.  Remember that fruits with the letter P in them will help you poop.     From there, take 2-3 capfuls of miralax mixed into 8-16oz of fluids (water, juice, sport drinks) twice per day (morning and early afternoon; avoid taking in late evening or night).      -Can also take miralax and add senna (exlax) taking two 8.6mg tablets twice daily.     If no improvement with using miralax with senna, next steps would be to add biscodyl (dolcolax) 10-15mg oral tablet daily or consider using biscodyl suppository or  fleet enema.     Eating and drinking      Eat foods that have a lot of fiber, such as:    o Fresh fruits and vegetables.   o Whole grains.   o Beans.   Eat less of foods that are low in fiber and high in fat and sugar, such as:   o French fries.   o Hamburgers.   o Cookies.   o Candy.   o Soda.   Drink enough fluid to keep your pee (urine) pale yellow.      Other helpful tips  -Exercise regularly or as told by your doctor. Try to do 150 minutes of exercise each week.    - Go to the restroom when you feel like you need to poop. Do not hold it in.    -When you poop:   o Do deep breathing while relaxing your lower belly (abdomen).   o Relax your pelvic floor. The pelvic floor is a group of muscles that support the rectum, bladder, and intestines (as well as the uterus in women).    - Watch your condition for any changes. Tell your doctor if you notice any. Keep all follow-up visits as told by your doctor.     Contact a doctor if:   You have pain that gets worse.   You have a fever.   You have not pooped for 4 days.   You vomit.   You are not hungry.   You lose weight.   You are bleeding from the opening of the butt (anus).   You have thin, pencil-like poop.

## 2025-03-12 NOTE — PROGRESS NOTES
[facility] - Primary Care   PCP progress note     Patient: Kristal Pena : 1960  Sex: female  MRN: [id number]    Assessment and Plan:     Assessment & Plan  Encounter for adult wellness visit   Generally doing well,  Normal labs in December,   Cholesterol has been stable, no weight or vital changes, can hold off and check next year         Right upper quadrant abdominal tenderness with rebound tenderness  Did have cholelithiasis on recent CT,  Check ultrasound for gallbladder pathology  May be muscular if no findings on gallbladder  Advised lidocaine patch and given home exercises for MSK etiology if abd US negative    Orders:    US ABDOMEN LIMITED; Future    Constipation, unspecified constipation type   Given handout on bowel clean out, see pt instructions  Advised to call if escalates  Instructed on daily stool softener after resolution         MICHELLE (obstructive sleep apnea)   Not able to tolerate face mask, had oral appliance made by dentist and reports going well with this.                 -Risks and benefit as well as most common side effects of new medications were discussed with patient.  -Recommended immunizations and screenings as noted in patients care gaps report. Patient was agreeable to screening tests for appropriate indications as listed above       Patient's questions answered. Additional information printed on AVS.  Patient/Caregiver verbalizes understanding of plan of care/instructions discussed today.       LOS Today       Follow-up Information  No follow-ups on file.      Subjective:  HPI:   Chief Complaint   Patient presents with    new to provider     New pt  Hx of MICHELLE, BPV, Palpitations, constipation  Pain on the right side of her ribs, anterior.   Has been going on for a few months. It is there most of the time, when she bends over it feels like it is pinched.   Not tender.   More just aching.   Not sure if associated with eating.     Constipation has come back. She uses

## 2025-03-13 ENCOUNTER — HOSPITAL ENCOUNTER (OUTPATIENT)
Dept: ULTRASOUND IMAGING | Age: 65
Discharge: HOME | End: 2025-03-13
Payer: OTHER GOVERNMENT

## 2025-03-13 DIAGNOSIS — R10.821 RIGHT UPPER QUADRANT ABDOMINAL TENDERNESS WITH REBOUND TENDERNESS: ICD-10-CM

## 2025-03-13 PROCEDURE — 76705 ECHO EXAM OF ABDOMEN: CPT

## 2025-03-14 ENCOUNTER — RESULTS FOLLOW-UP (OUTPATIENT)
Dept: ULTRASOUND IMAGING | Age: 65
End: 2025-03-14

## 2025-03-17 DIAGNOSIS — K80.20 CALCULUS OF GALLBLADDER WITHOUT CHOLECYSTITIS WITHOUT OBSTRUCTION: Primary | ICD-10-CM

## 2025-04-09 ENCOUNTER — INITIAL CONSULT (OUTPATIENT)
Dept: SURGERY | Age: 65
End: 2025-04-09
Payer: OTHER GOVERNMENT

## 2025-04-09 VITALS — DIASTOLIC BLOOD PRESSURE: 80 MMHG | WEIGHT: 222 LBS | SYSTOLIC BLOOD PRESSURE: 130 MMHG | BODY MASS INDEX: 31.85 KG/M2

## 2025-04-09 DIAGNOSIS — K80.50 BILIARY COLIC: Primary | ICD-10-CM

## 2025-04-09 PROCEDURE — 99244 OFF/OP CNSLTJ NEW/EST MOD 40: CPT | Performed by: STUDENT IN AN ORGANIZED HEALTH CARE EDUCATION/TRAINING PROGRAM

## 2025-04-09 NOTE — PROGRESS NOTES
GENERAL SURGERY  Office Note    Patient Name: Kristal Pena  MRN: [id number]  YOB: 1960   Date of Service: 2025    Referred by:  Juan Steve MD     Chief Complaint   Patient presents with    Surgical Consult     Consult - Gallbladder - Dr. Case - CT 3/13 - Right sided pain, has IBS and thinks it could be related, mirilax doesn't really help, pain comes and goes, dull pain, eating well,        SUBJECTIVE:     History of Present Illness  The patient, a 65-year-old female, presents with a history of intermittent right-sided abdominal pain persisting for 3-4 months.    Abdominal Pain  - The pain is described as deep with occasional pinching sensations upon bending.  - She reports no current pain or nausea.    Constipation  - The patient has a history of constipation, which she manages with prune juice.  - She suspects the involvement of colonic issues or cholelithiasis.  - She has experienced chronic colonic issues since childhood, with her first hospitalization for constipation occurring at age 10.  - She underwent rectocele repair surgery over a decade ago and is considering the possibility of an obstruction.    Supplemental information: A colonoscopy performed in  revealed diverticulosis, with a recommendation for a repeat procedure in 10 years. A computed tomography (CT) scan conducted in 2024 identified a single gallstone, which was subsequently confirmed by ultrasonography.    FAMILY HISTORY  Son, sister, and brother had gallbladder surgery.   of ALS.      REVIEW OF SYSTEMS  A comprehensive review of systems was completed and is negative except for any elements noted above.    Past Medical History:   Diagnosis Date    Benign positional vertigo     Constipation     Irritable bowel syndrome     Prolapse of female pelvic organs     Shortness of breath     Twitching      Past Surgical History:   Procedure Laterality Date    ABDOMEN SURGERY      pelvic organ

## 2025-04-09 NOTE — PATIENT INSTRUCTIONS
GENERAL SURGERY  Pre-operative Information    Your surgeon is Dr. Carlos Kinsey MD    Your procedure is scheduled at:      [facility]  [address]  [place], [place] [address]    Date: 5/12/2025    Arrival time: 6:00AM      Surgery time: 7:30AM    ? Check in for surgery on the first floor of the main hospital ?    Planned anesthesia: Outpatient with general anesthesia      A responsible adult (18+ years) must be present at the time of check in and remain until discharge.  You will not be able to drive home after surgery or for the next 24 hours, due to anesthesia.    Please arrange a ride to and from the hospital.     Nothing to eat or drink after midnight the night before surgery  No tobacco or nicotine after midnight prior to surgery  On the morning of surgery, only take the medications you were instructed to take with a sip of water.   Please refrain from taking all vitamins and/or nutritional supplements for 48 hours prior to your procedure.   If you take a blood thinner (Coumadin, Plavix, Xaretlo, Eliquis, and over the counter fish oil (Omega 3)), you may be asked to stop this medication up to 5 days prior to surgery, if this was not addressed, please contact the office for clarification.    If you take an oral or injectable diabetes / weight loss medication (GLP-1 agonist), listed below, you will need to stop taking it before surgery.   For medications taken daily, do not take for at least 1 day before surgery   For medications taken weekly, do not take for at least 7 days before surgery  Semaglutide (OZEMPIC, WEGOVY, RYBELSUS)  Dulaglutide (TRULICITY)  Liraglutide (VICTOZA)  Tiezepatide (MOUNJARO)  Orlistat (NAPOLEON / ZENICAL)  Phentermine (ADIPEX) / Topiramate (QSYMIA)    If you were asked to get a History and Physical (H&P) from your primary doctor (PCP) prior to surgery, please make these arrangements within 30 days of the procedure.    You must bring your photo ID and insurance card with